# Patient Record
Sex: MALE | Race: BLACK OR AFRICAN AMERICAN | Employment: FULL TIME | URBAN - METROPOLITAN AREA
[De-identification: names, ages, dates, MRNs, and addresses within clinical notes are randomized per-mention and may not be internally consistent; named-entity substitution may affect disease eponyms.]

---

## 2022-07-22 ENCOUNTER — HOSPITAL ENCOUNTER (EMERGENCY)
Age: 57
Discharge: HOME | End: 2022-07-22
Attending: EMERGENCY MEDICINE
Payer: COMMERCIAL

## 2022-07-22 ENCOUNTER — APPOINTMENT (OUTPATIENT)
Dept: ULTRASOUND IMAGING | Age: 57
End: 2022-07-22
Attending: EMERGENCY MEDICINE
Payer: COMMERCIAL

## 2022-07-22 VITALS
DIASTOLIC BLOOD PRESSURE: 97 MMHG | HEIGHT: 69 IN | RESPIRATION RATE: 18 BRPM | SYSTOLIC BLOOD PRESSURE: 146 MMHG | TEMPERATURE: 98.7 F | OXYGEN SATURATION: 97 % | BODY MASS INDEX: 28.88 KG/M2 | HEART RATE: 64 BPM | WEIGHT: 195 LBS

## 2022-07-22 DIAGNOSIS — L03.116 CELLULITIS OF LEFT LEG: Primary | ICD-10-CM

## 2022-07-22 LAB
ALBUMIN SERPL-MCNC: 3.5 G/DL (ref 3.5–5.2)
ALBUMIN/GLOB SERPL: 0.9 {RATIO} (ref 1.1–2.2)
ALP SERPL-CCNC: 73 U/L (ref 40–129)
ALT SERPL-CCNC: 28 U/L (ref 10–50)
ANION GAP SERPL CALC-SCNC: 10 MMOL/L (ref 5–15)
AST SERPL-CCNC: 26 U/L (ref 10–50)
BASOPHILS # BLD: 0.1 K/UL (ref 0–0.1)
BASOPHILS NFR BLD: 1 % (ref 0–1)
BILIRUB SERPL-MCNC: 0.4 MG/DL (ref 0.2–1)
BUN SERPL-MCNC: 17 MG/DL (ref 6–20)
BUN/CREAT SERPL: 13 (ref 12–20)
CALCIUM SERPL-MCNC: 8.7 MG/DL (ref 8.6–10)
CHLORIDE SERPL-SCNC: 107 MMOL/L (ref 98–107)
CO2 SERPL-SCNC: 22 MMOL/L (ref 22–29)
CREAT SERPL-MCNC: 1.31 MG/DL (ref 0.7–1.2)
DIFFERENTIAL METHOD BLD: ABNORMAL
EOSINOPHIL # BLD: 0.2 K/UL (ref 0–0.4)
EOSINOPHIL NFR BLD: 3 % (ref 0–7)
ERYTHROCYTE [DISTWIDTH] IN BLOOD BY AUTOMATED COUNT: 12.9 % (ref 11.5–14.5)
GLOBULIN SER CALC-MCNC: 3.8 G/DL (ref 2–4)
GLUCOSE SERPL-MCNC: 97 MG/DL (ref 65–100)
HCT VFR BLD AUTO: 43.1 % (ref 36.6–50.3)
HGB BLD-MCNC: 15.1 G/DL (ref 12.1–17)
IMM GRANULOCYTES # BLD AUTO: 0 K/UL (ref 0–0.04)
IMM GRANULOCYTES NFR BLD AUTO: 1 % (ref 0–0.5)
LACTATE SERPL-SCNC: 0.8 MMOL/L (ref 0.4–2)
LYMPHOCYTES # BLD: 2.2 K/UL (ref 0.8–3.5)
LYMPHOCYTES NFR BLD: 31 % (ref 12–49)
MCH RBC QN AUTO: 30.8 PG (ref 26–34)
MCHC RBC AUTO-ENTMCNC: 35 G/DL (ref 30–36.5)
MCV RBC AUTO: 88 FL (ref 80–99)
MONOCYTES # BLD: 0.8 K/UL (ref 0–1)
MONOCYTES NFR BLD: 11 % (ref 5–13)
NEUTS SEG # BLD: 3.8 K/UL (ref 1.8–8)
NEUTS SEG NFR BLD: 53 % (ref 32–75)
NRBC # BLD: 0 K/UL (ref 0–0.01)
NRBC BLD-RTO: 0 PER 100 WBC
PLATELET # BLD AUTO: 242 K/UL (ref 150–400)
PMV BLD AUTO: 11.1 FL (ref 8.9–12.9)
POTASSIUM SERPL-SCNC: 4.6 MMOL/L (ref 3.5–5.1)
PROT SERPL-MCNC: 7.3 G/DL (ref 6.4–8.3)
RBC # BLD AUTO: 4.9 M/UL (ref 4.1–5.7)
SODIUM SERPL-SCNC: 139 MMOL/L (ref 136–145)
WBC # BLD AUTO: 7.2 K/UL (ref 4.1–11.1)

## 2022-07-22 PROCEDURE — 93971 EXTREMITY STUDY: CPT

## 2022-07-22 PROCEDURE — 36415 COLL VENOUS BLD VENIPUNCTURE: CPT

## 2022-07-22 PROCEDURE — 83605 ASSAY OF LACTIC ACID: CPT

## 2022-07-22 PROCEDURE — 87040 BLOOD CULTURE FOR BACTERIA: CPT

## 2022-07-22 PROCEDURE — 96361 HYDRATE IV INFUSION ADD-ON: CPT

## 2022-07-22 PROCEDURE — 74011250636 HC RX REV CODE- 250/636: Performed by: EMERGENCY MEDICINE

## 2022-07-22 PROCEDURE — 96360 HYDRATION IV INFUSION INIT: CPT

## 2022-07-22 PROCEDURE — 80053 COMPREHEN METABOLIC PANEL: CPT

## 2022-07-22 PROCEDURE — 85025 COMPLETE CBC W/AUTO DIFF WBC: CPT

## 2022-07-22 PROCEDURE — 99284 EMERGENCY DEPT VISIT MOD MDM: CPT

## 2022-07-22 RX ORDER — DOXYCYCLINE HYCLATE 100 MG
100 TABLET ORAL 2 TIMES DAILY
Qty: 14 TABLET | Refills: 0 | Status: SHIPPED | OUTPATIENT
Start: 2022-07-22 | End: 2022-07-29

## 2022-07-22 RX ORDER — SULFAMETHOXAZOLE AND TRIMETHOPRIM 800; 160 MG/1; MG/1
1 TABLET ORAL 2 TIMES DAILY
COMMUNITY
End: 2022-08-21

## 2022-07-22 RX ADMIN — SODIUM CHLORIDE 1000 ML: 9 INJECTION, SOLUTION INTRAVENOUS at 07:11

## 2022-07-22 NOTE — DISCHARGE INSTRUCTIONS
Please stop taking Bactrim. Begin taking doxycycline. Be sure to drink plenty of water with the new antibiotic as it can cause stomach upset. Thank you for allowing us to provide you with medical care today. We realize that you have many choices for your emergency care needs. We thank you for choosing [facility]. Please choose us in the future for any continued health care needs. We hope we addressed all of your medical concerns. We strive to provide excellent quality care in the Emergency Department. Anything less than excellent does not meet our expectations. The exam and treatment you received in the Emergency Department were for an emergent problem and are not intended as complete care. It is important that you follow up with a doctor, nurse practitioner, or physician's assistant for ongoing care. If your symptoms worsen or you do not improve as expected and you are unable to reach your usual health care provider, you should return to the Emergency Department. We are available 24 hours a day. Take this sheet with you when you go to your follow-up visit. If you have any problem arranging the follow-up visit, contact the Emergency Department immediately. Make an appointment your family doctor for follow up of this visit. Return to the ER if you are unable to be seen in a timely manner.

## 2022-07-22 NOTE — ED NOTES
42-year-old male received in turnover this morning pending remainder of his work-up. He presents with a left leg swelling. He was diagnosed with cellulitis several days ago and started on Bactrim without significant improvement. I personally evaluated the patient and feel that he likely has cellulitis based on my exam.  Labs and imaging are reassuring. Bactrim would cover staph well but does not offer strep coverage. His cellulitis is most likely to be strep given the presentation. Plan to switch to doxycycline with instructions to follow-up with primary care, return if needed.

## 2022-07-22 NOTE — ED TRIAGE NOTES
PT has left leg swelling for a week. He went to pt first on the 17th and then again on the 19th. They diagnosed him with cellulitis. He was given Sulfamethoxazle. Pt first said he needs to go to the ER and get IV antibiotics.

## 2022-07-22 NOTE — ED PROVIDER NOTES
49-year-old male with no significant past medical history presents with complaints of 8 days left lower extremity swelling with redness and warmth. Patient started Bactrim 5 days ago by urgent care center with minimal improvement. Patient had a follow-up appointment and was told if he did not improve after 2 days then he should come to the emergency department. Patient reports he has been having subjective fever and chills. Denies trauma. Denies history of DVT/PE. Denies chest pain, shortness of breath. Denies nausea, vomiting, diarrhea, constipation. denies urinary complaints. Denies tobacco use, drug use  Occasional alcohol use       No past medical history on file. No past surgical history on file. No family history on file. Social History     Socioeconomic History    Marital status:      Spouse name: Not on file    Number of children: Not on file    Years of education: Not on file    Highest education level: Not on file   Occupational History    Not on file   Tobacco Use    Smoking status: Not on file    Smokeless tobacco: Not on file   Substance and Sexual Activity    Alcohol use: Not on file    Drug use: Not on file    Sexual activity: Not on file   Other Topics Concern    Not on file   Social History Narrative    Not on file     Social Determinants of Health     Financial Resource Strain: Not on file   Food Insecurity: Not on file   Transportation Needs: Not on file   Physical Activity: Not on file   Stress: Not on file   Social Connections: Not on file   Intimate Partner Violence: Not on file   Housing Stability: Not on file         ALLERGIES: Patient has no known allergies. Review of Systems   Constitutional:  Positive for chills and fever. HENT:  Negative for congestion, nosebleeds and sore throat. Eyes:  Negative for pain and discharge. Respiratory:  Negative for cough and shortness of breath. Cardiovascular:  Positive for leg swelling.  Negative for chest pain and palpitations. Gastrointestinal:  Negative for abdominal pain, constipation, nausea and vomiting. Genitourinary:  Negative for decreased urine volume, dysuria, flank pain and urgency. Musculoskeletal:  Negative for gait problem and myalgias. Skin:  Positive for color change. Negative for rash and wound. Neurological:  Negative for seizures and syncope. Hematological:  Does not bruise/bleed easily. Psychiatric/Behavioral:  Negative for confusion, self-injury and suicidal ideas. Vitals:    07/22/22 0643   BP: (!) 158/108   Pulse: 64   Resp: 18   Temp: 98.7 °F (37.1 °C)   SpO2: 96%   Weight: 88.5 kg (195 lb)   Height: 5' 9\" (1.753 m)            Physical Exam  Vitals and nursing note reviewed. Constitutional:       Appearance: He is well-developed. HENT:      Head: Normocephalic and atraumatic. Eyes:      Pupils: Pupils are equal, round, and reactive to light. Cardiovascular:      Rate and Rhythm: Normal rate and regular rhythm. Heart sounds: Normal heart sounds. Pulmonary:      Effort: Pulmonary effort is normal. No respiratory distress. Breath sounds: Normal breath sounds. No wheezing. Abdominal:      General: Bowel sounds are normal.      Palpations: Abdomen is soft. Tenderness: There is no abdominal tenderness. There is no guarding or rebound. Musculoskeletal:         General: Normal range of motion. Cervical back: Normal range of motion and neck supple. Comments: Left lower extremity swelling with warmth and mild erythema up to knee. Dorsal pedal pulses 2+ bilaterally   Skin:     General: Skin is warm and dry. Neurological:      Mental Status: He is alert and oriented to person, place, and time.    Psychiatric:         Behavior: Behavior normal.        MDM  Number of Diagnoses or Management Options  Diagnosis management comments: 68-year-old male with no significant past medical history presents with complaints of 8 days left lower extremity swelling with subjective fever and chills. Patient is afebrile, nontoxic, hemodynamically stable, no respiratory distress, clear to auscultation bilaterally, normal room oxygen saturation, speaking complete sentences. Plan-CBC/CMP/lactate, blood cultures, IV fluid hydration, left lower extremity duplex. Amount and/or Complexity of Data Reviewed  Clinical lab tests: ordered  Tests in the radiology section of CPT®: ordered           Procedures        7AM  Change of shift. Care of patient signed over to Estrella Castrejon. Bedside handoff complete. Awaiting labs, duplex, dispo.

## 2022-07-27 LAB
BACTERIA SPEC CULT: NORMAL
SERVICE CMNT-IMP: NORMAL

## 2022-08-21 ENCOUNTER — HOSPITAL ENCOUNTER (INPATIENT)
Age: 57
LOS: 4 days | Discharge: HOME | DRG: 603 | End: 2022-08-26
Attending: EMERGENCY MEDICINE | Admitting: STUDENT IN AN ORGANIZED HEALTH CARE EDUCATION/TRAINING PROGRAM
Payer: COMMERCIAL

## 2022-08-21 ENCOUNTER — HOSPITAL ENCOUNTER (EMERGENCY)
Dept: CT IMAGING | Age: 57
Discharge: HOME | DRG: 603 | End: 2022-08-21
Attending: EMERGENCY MEDICINE
Payer: COMMERCIAL

## 2022-08-21 DIAGNOSIS — L03.116 CELLULITIS OF LEFT LOWER EXTREMITY: ICD-10-CM

## 2022-08-21 DIAGNOSIS — B95.1 BACTEREMIA DUE TO GROUP B STREPTOCOCCUS: ICD-10-CM

## 2022-08-21 DIAGNOSIS — D72.829 LEUKOCYTOSIS, UNSPECIFIED TYPE: ICD-10-CM

## 2022-08-21 DIAGNOSIS — R78.81 BACTEREMIA DUE TO GROUP B STREPTOCOCCUS: ICD-10-CM

## 2022-08-21 DIAGNOSIS — A41.9 SEPSIS WITHOUT ACUTE ORGAN DYSFUNCTION, DUE TO UNSPECIFIED ORGANISM (HCC): Primary | ICD-10-CM

## 2022-08-21 LAB
ALBUMIN SERPL-MCNC: 3.8 G/DL (ref 3.5–5.2)
ALBUMIN/GLOB SERPL: 1.1 {RATIO} (ref 1.1–2.2)
ALP SERPL-CCNC: 77 U/L (ref 40–129)
ALT SERPL-CCNC: 20 U/L (ref 10–50)
ANION GAP SERPL CALC-SCNC: 10 MMOL/L (ref 5–15)
APTT PPP: 26.1 SEC (ref 21.2–34.1)
AST SERPL-CCNC: 29 U/L (ref 10–50)
BASOPHILS # BLD: 0 K/UL (ref 0–0.1)
BASOPHILS NFR BLD: 0 % (ref 0–1)
BILIRUB SERPL-MCNC: 0.4 MG/DL (ref 0.2–1)
BUN SERPL-MCNC: 13 MG/DL (ref 6–20)
BUN/CREAT SERPL: 12 (ref 12–20)
CALCIUM SERPL-MCNC: 9.2 MG/DL (ref 8.6–10)
CHLORIDE SERPL-SCNC: 104 MMOL/L (ref 98–107)
CK SERPL-CCNC: 183 U/L (ref 20–200)
CO2 SERPL-SCNC: 26 MMOL/L (ref 22–29)
COMMENT, HOLDF: NORMAL
CREAT SERPL-MCNC: 1.1 MG/DL (ref 0.7–1.2)
CRP SERPL-MCNC: 1.08 MG/DL
DIFFERENTIAL METHOD BLD: ABNORMAL
EOSINOPHIL # BLD: 0 K/UL (ref 0–0.4)
EOSINOPHIL NFR BLD: 0 % (ref 0–7)
ERYTHROCYTE [DISTWIDTH] IN BLOOD BY AUTOMATED COUNT: 12.6 % (ref 11.5–14.5)
GLOBULIN SER CALC-MCNC: 3.6 G/DL (ref 2–4)
GLUCOSE SERPL-MCNC: 96 MG/DL (ref 65–100)
HCT VFR BLD AUTO: 40 % (ref 36.6–50.3)
HGB BLD-MCNC: 13.8 G/DL (ref 12.1–17)
IMM GRANULOCYTES # BLD AUTO: 0.1 K/UL (ref 0–0.04)
IMM GRANULOCYTES NFR BLD AUTO: 0 % (ref 0–0.5)
INR PPP: 1.1 (ref 0.9–1.1)
LACTATE SERPL-SCNC: 2.4 MMOL/L (ref 0.4–2)
LYMPHOCYTES # BLD: 0.9 K/UL (ref 0.8–3.5)
LYMPHOCYTES NFR BLD: 6 % (ref 12–49)
MCH RBC QN AUTO: 30.6 PG (ref 26–34)
MCHC RBC AUTO-ENTMCNC: 34.5 G/DL (ref 30–36.5)
MCV RBC AUTO: 88.7 FL (ref 80–99)
MONOCYTES # BLD: 0.7 K/UL (ref 0–1)
MONOCYTES NFR BLD: 5 % (ref 5–13)
NEUTS SEG # BLD: 13.7 K/UL (ref 1.8–8)
NEUTS SEG NFR BLD: 89 % (ref 32–75)
NRBC # BLD: 0 K/UL (ref 0–0.01)
NRBC BLD-RTO: 0 PER 100 WBC
PLATELET # BLD AUTO: 255 K/UL (ref 150–400)
PMV BLD AUTO: 10 FL (ref 8.9–12.9)
POTASSIUM SERPL-SCNC: 4.1 MMOL/L (ref 3.5–5.1)
PROT SERPL-MCNC: 7.4 G/DL (ref 6.4–8.3)
PROTHROMBIN TIME: 14.2 SEC (ref 11.9–14.6)
RBC # BLD AUTO: 4.51 M/UL (ref 4.1–5.7)
SAMPLES BEING HELD,HOLD: NORMAL
SODIUM SERPL-SCNC: 140 MMOL/L (ref 136–145)
THERAPEUTIC RANGE,PTTT: NORMAL SECS (ref 82–109)
WBC # BLD AUTO: 15.4 K/UL (ref 4.1–11.1)

## 2022-08-21 PROCEDURE — 87186 SC STD MICRODIL/AGAR DIL: CPT

## 2022-08-21 PROCEDURE — 74011250637 HC RX REV CODE- 250/637: Performed by: EMERGENCY MEDICINE

## 2022-08-21 PROCEDURE — 96374 THER/PROPH/DIAG INJ IV PUSH: CPT

## 2022-08-21 PROCEDURE — 87147 CULTURE TYPE IMMUNOLOGIC: CPT

## 2022-08-21 PROCEDURE — 96375 TX/PRO/DX INJ NEW DRUG ADDON: CPT

## 2022-08-21 PROCEDURE — 87150 DNA/RNA AMPLIFIED PROBE: CPT

## 2022-08-21 PROCEDURE — 74011250636 HC RX REV CODE- 250/636: Performed by: EMERGENCY MEDICINE

## 2022-08-21 PROCEDURE — 96365 THER/PROPH/DIAG IV INF INIT: CPT

## 2022-08-21 PROCEDURE — 72193 CT PELVIS W/DYE: CPT

## 2022-08-21 PROCEDURE — 99285 EMERGENCY DEPT VISIT HI MDM: CPT

## 2022-08-21 PROCEDURE — 87077 CULTURE AEROBIC IDENTIFY: CPT

## 2022-08-21 PROCEDURE — 36415 COLL VENOUS BLD VENIPUNCTURE: CPT

## 2022-08-21 PROCEDURE — 86140 C-REACTIVE PROTEIN: CPT

## 2022-08-21 PROCEDURE — 80053 COMPREHEN METABOLIC PANEL: CPT

## 2022-08-21 PROCEDURE — 85025 COMPLETE CBC W/AUTO DIFF WBC: CPT

## 2022-08-21 PROCEDURE — 83605 ASSAY OF LACTIC ACID: CPT

## 2022-08-21 PROCEDURE — 85610 PROTHROMBIN TIME: CPT

## 2022-08-21 PROCEDURE — 87040 BLOOD CULTURE FOR BACTERIA: CPT

## 2022-08-21 PROCEDURE — 74011000636 HC RX REV CODE- 636: Performed by: EMERGENCY MEDICINE

## 2022-08-21 PROCEDURE — 85730 THROMBOPLASTIN TIME PARTIAL: CPT

## 2022-08-21 PROCEDURE — 74011000258 HC RX REV CODE- 258: Performed by: EMERGENCY MEDICINE

## 2022-08-21 PROCEDURE — 93005 ELECTROCARDIOGRAM TRACING: CPT

## 2022-08-21 PROCEDURE — 82550 ASSAY OF CK (CPK): CPT

## 2022-08-21 RX ORDER — VANCOMYCIN 2 GRAM/500 ML IN 0.9 % SODIUM CHLORIDE INTRAVENOUS
2000 ONCE
Status: DISCONTINUED | OUTPATIENT
Start: 2022-08-21 | End: 2022-08-21 | Stop reason: SDUPTHER

## 2022-08-21 RX ORDER — ONDANSETRON 2 MG/ML
4 INJECTION INTRAMUSCULAR; INTRAVENOUS
Status: COMPLETED | OUTPATIENT
Start: 2022-08-21 | End: 2022-08-21

## 2022-08-21 RX ORDER — ACETAMINOPHEN 500 MG
1000 TABLET ORAL
Status: COMPLETED | OUTPATIENT
Start: 2022-08-21 | End: 2022-08-21

## 2022-08-21 RX ORDER — MORPHINE SULFATE 4 MG/ML
4 INJECTION INTRAVENOUS ONCE
Status: COMPLETED | OUTPATIENT
Start: 2022-08-21 | End: 2022-08-21

## 2022-08-21 RX ORDER — KETOROLAC TROMETHAMINE 30 MG/ML
30 INJECTION, SOLUTION INTRAMUSCULAR; INTRAVENOUS
Status: COMPLETED | OUTPATIENT
Start: 2022-08-21 | End: 2022-08-21

## 2022-08-21 RX ADMIN — VANCOMYCIN HYDROCHLORIDE 1250 MG: 1.25 INJECTION, POWDER, LYOPHILIZED, FOR SOLUTION INTRAVENOUS at 22:51

## 2022-08-21 RX ADMIN — PIPERACILLIN AND TAZOBACTAM 4.5 G: 4; .5 INJECTION, POWDER, FOR SOLUTION INTRAVENOUS at 22:47

## 2022-08-21 RX ADMIN — ONDANSETRON 4 MG: 2 INJECTION INTRAMUSCULAR; INTRAVENOUS at 22:41

## 2022-08-21 RX ADMIN — MORPHINE SULFATE 4 MG: 4 INJECTION INTRAVENOUS at 22:41

## 2022-08-21 RX ADMIN — SODIUM CHLORIDE 1000 ML: 9 INJECTION, SOLUTION INTRAVENOUS at 23:05

## 2022-08-21 RX ADMIN — KETOROLAC TROMETHAMINE 30 MG: 30 INJECTION, SOLUTION INTRAMUSCULAR at 22:41

## 2022-08-21 RX ADMIN — ACETAMINOPHEN 1000 MG: 500 TABLET ORAL at 23:35

## 2022-08-21 RX ADMIN — IOPAMIDOL 100 ML: 755 INJECTION, SOLUTION INTRAVENOUS at 23:25

## 2022-08-22 ENCOUNTER — APPOINTMENT (OUTPATIENT)
Dept: VASCULAR SURGERY | Age: 57
DRG: 603 | End: 2022-08-22
Attending: INTERNAL MEDICINE
Payer: COMMERCIAL

## 2022-08-22 PROBLEM — A41.9 SEPSIS (HCC): Status: ACTIVE | Noted: 2022-08-22

## 2022-08-22 LAB
ATRIAL RATE: 91 BPM
CALCULATED P AXIS, ECG09: 61 DEGREES
CALCULATED R AXIS, ECG10: -2 DEGREES
CALCULATED T AXIS, ECG11: 37 DEGREES
DIAGNOSIS, 93000: NORMAL
LACTATE SERPL-SCNC: 2 MMOL/L (ref 0.4–2)
LACTATE SERPL-SCNC: 2.4 MMOL/L (ref 0.4–2)
P-R INTERVAL, ECG05: 128 MS
Q-T INTERVAL, ECG07: 328 MS
QRS DURATION, ECG06: 86 MS
QTC CALCULATION (BEZET), ECG08: 403 MS
VENTRICULAR RATE, ECG03: 91 BPM

## 2022-08-22 PROCEDURE — 83605 ASSAY OF LACTIC ACID: CPT

## 2022-08-22 PROCEDURE — 93970 EXTREMITY STUDY: CPT

## 2022-08-22 PROCEDURE — 74011000250 HC RX REV CODE- 250: Performed by: INTERNAL MEDICINE

## 2022-08-22 PROCEDURE — 74011250636 HC RX REV CODE- 250/636: Performed by: EMERGENCY MEDICINE

## 2022-08-22 PROCEDURE — 36415 COLL VENOUS BLD VENIPUNCTURE: CPT

## 2022-08-22 PROCEDURE — 74011250636 HC RX REV CODE- 250/636: Performed by: INTERNAL MEDICINE

## 2022-08-22 PROCEDURE — 74011250637 HC RX REV CODE- 250/637: Performed by: INTERNAL MEDICINE

## 2022-08-22 PROCEDURE — 74011000258 HC RX REV CODE- 258: Performed by: INTERNAL MEDICINE

## 2022-08-22 PROCEDURE — 65270000029 HC RM PRIVATE

## 2022-08-22 RX ORDER — ONDANSETRON 4 MG/1
4 TABLET, ORALLY DISINTEGRATING ORAL
Status: DISCONTINUED | OUTPATIENT
Start: 2022-08-22 | End: 2022-08-26 | Stop reason: HOSPADM

## 2022-08-22 RX ORDER — ENOXAPARIN SODIUM 100 MG/ML
40 INJECTION SUBCUTANEOUS DAILY
Status: DISCONTINUED | OUTPATIENT
Start: 2022-08-22 | End: 2022-08-26 | Stop reason: HOSPADM

## 2022-08-22 RX ORDER — POLYETHYLENE GLYCOL 3350 17 G/17G
17 POWDER, FOR SOLUTION ORAL DAILY PRN
Status: DISCONTINUED | OUTPATIENT
Start: 2022-08-22 | End: 2022-08-26 | Stop reason: HOSPADM

## 2022-08-22 RX ORDER — SODIUM CHLORIDE 0.9 % (FLUSH) 0.9 %
5-40 SYRINGE (ML) INJECTION EVERY 8 HOURS
Status: DISCONTINUED | OUTPATIENT
Start: 2022-08-22 | End: 2022-08-26 | Stop reason: HOSPADM

## 2022-08-22 RX ORDER — ACETAMINOPHEN 650 MG/1
650 SUPPOSITORY RECTAL
Status: DISCONTINUED | OUTPATIENT
Start: 2022-08-22 | End: 2022-08-26 | Stop reason: HOSPADM

## 2022-08-22 RX ORDER — SODIUM CHLORIDE 0.9 % (FLUSH) 0.9 %
5-40 SYRINGE (ML) INJECTION AS NEEDED
Status: DISCONTINUED | OUTPATIENT
Start: 2022-08-22 | End: 2022-08-26 | Stop reason: HOSPADM

## 2022-08-22 RX ORDER — ACETAMINOPHEN 325 MG/1
650 TABLET ORAL
Status: DISCONTINUED | OUTPATIENT
Start: 2022-08-22 | End: 2022-08-26 | Stop reason: HOSPADM

## 2022-08-22 RX ORDER — SODIUM CHLORIDE 9 MG/ML
125 INJECTION, SOLUTION INTRAVENOUS CONTINUOUS
Status: DISCONTINUED | OUTPATIENT
Start: 2022-08-22 | End: 2022-08-24

## 2022-08-22 RX ORDER — ONDANSETRON 2 MG/ML
4 INJECTION INTRAMUSCULAR; INTRAVENOUS
Status: DISCONTINUED | OUTPATIENT
Start: 2022-08-22 | End: 2022-08-26 | Stop reason: HOSPADM

## 2022-08-22 RX ADMIN — CEFEPIME 2 G: 2 INJECTION, POWDER, FOR SOLUTION INTRAVENOUS at 20:48

## 2022-08-22 RX ADMIN — VANCOMYCIN HYDROCHLORIDE 1000 MG: 1 INJECTION, POWDER, LYOPHILIZED, FOR SOLUTION INTRAVENOUS at 02:53

## 2022-08-22 RX ADMIN — ENOXAPARIN SODIUM 40 MG: 100 INJECTION SUBCUTANEOUS at 09:30

## 2022-08-22 RX ADMIN — SODIUM CHLORIDE 125 ML/HR: 9 INJECTION, SOLUTION INTRAVENOUS at 10:52

## 2022-08-22 RX ADMIN — VANCOMYCIN HYDROCHLORIDE 750 MG: 750 INJECTION, POWDER, LYOPHILIZED, FOR SOLUTION INTRAVENOUS at 22:15

## 2022-08-22 RX ADMIN — ACETAMINOPHEN 650 MG: 325 TABLET ORAL at 15:34

## 2022-08-22 RX ADMIN — SODIUM CHLORIDE 2 G: 9 INJECTION INTRAMUSCULAR; INTRAVENOUS; SUBCUTANEOUS at 09:30

## 2022-08-22 RX ADMIN — VANCOMYCIN HYDROCHLORIDE 750 MG: 750 INJECTION, POWDER, LYOPHILIZED, FOR SOLUTION INTRAVENOUS at 10:52

## 2022-08-22 NOTE — PROGRESS NOTES
[facility] Progress Note: Antimicrobial Stewardship    Consult for antibiotic dosing of vancomycin by Dr. Harini Frances  Indication: SSTI  Day of Therapy: 2    Plan:  Vancomycin therapy: The patient received vancomycin 2250 mg in the ED. Follow with maintenance dose of vancomycin 750 mg IV every 12 hours    Dose calculated to approximate a   Target AUC/MARCELO of less than 500  Trough of 10 - 15 mcg/mL. Plan:  Pharmacy to follow daily and will make changes to dose and/or frequency based on clinical status. Other Antimicrobial  (not dosed by pharmacist)   cefepime   Cultures     8/21 Blood x 2 In process   Serum Creatinine     Lab Results   Component Value Date/Time    Creatinine 1.10 08/21/2022 10:14 PM       Creatinine Clearance Estimated Creatinine Clearance: 81.5 mL/min (based on SCr of 1.1 mg/dL). Procalcitonin  No results found for: PCT     Temp   98.4 °F (36.9 °C)    WBC   Lab Results   Component Value Date/Time    WBC 15.4 (H) 08/21/2022 10:14 PM       For Antifungals, Metronidazole and Nafcillin: Lab Results   Component Value Date/Time    ALT (SGPT) 20 08/21/2022 10:14 PM    AST (SGOT) 29 08/21/2022 10:14 PM    Alk.  phosphatase 77 08/21/2022 10:14 PM    Bilirubin, total 0.4 08/21/2022 10:14 PM         Pharmacist: Yaz Napier

## 2022-08-22 NOTE — PROGRESS NOTES
Reason for Admission:  sepsis  Left leg cellulitis                   RUR Score:       5%              Plan for utilizing home health:      none    PCP: First and Last name:  Pt has been seen at [facility]    Name of Practice:    Are you a current patient: Yes/No:    Approximate date of last visit: 5 years ago   Can you participate in a virtual visit with your PCP:                     Current Advanced Directive/Advance Care Plan: Full Code  None--his wife is his next of kin    Healthcare Decision Maker:   Click here to complete [address] including selection of the Healthcare Decision Maker Relationship (ie \"Primary\")                             Transition of Care Plan:                    Met with pt for d/c planning. PTA he stated he was independent with ADL's, was driving and used no DME with ambulation. He lives in a 2 story house with his wife and son. There are 15 interior stairs and 5 entry steps. Medications are from [employer] [facility] [employer] [address]. Pt is being medically managed with IV vanc and cefepime  Doppler to r/o DVT  Pt was given a list of [employer] providers from which to choose a PCP  CM following for any d/c needs  Family to transport him home at d/c    Care Management Interventions  PCP Verified by CM: Yes  Mode of Transport at Discharge: Other (see comment)  Support Systems: Spouse/Significant Other, Child(kayla), Friend/Neighbor  Confirm Follow Up Transport: Family  Discharge Location  Patient Expects to be Discharged to[de-identified] Home with family assistance  GIL Russell

## 2022-08-22 NOTE — ED PROVIDER NOTES
Healthy. He presents accompanied by his wife with complaints of right groin pain. It began earlier today. It has progressed in its severity. He has had chills and feels nauseated. He was seen here approximately 1 month ago and diagnosed with left lower leg cellulitis. He had been seen previously for this at an urgent care center and prescribed antibiotics. He was prescribed a different antibiotic here. He took it for 7 days. He states that the symptoms improved but persisted. He was seen at another ED and prescribed 10 more days of the same antibiotic. He states that the right lower leg symptoms improved but never completely resolved. They have worsened again over the past few days. He has left lower leg swelling, discomfort, warmth and redness. However, today is when he began to notice the right groin pain. He believes he has a swollen lymph node in that region. He also has had chills and nausea. He took ibuprofen with limited relief. History reviewed. No pertinent past medical history. Past Surgical History:   Procedure Laterality Date    HX ORTHOPAEDIC           History reviewed. No pertinent family history.     Social History     Socioeconomic History    Marital status:      Spouse name: Not on file    Number of children: Not on file    Years of education: Not on file    Highest education level: Not on file   Occupational History    Not on file   Tobacco Use    Smoking status: Never    Smokeless tobacco: Never   Vaping Use    Vaping Use: Never used   Substance and Sexual Activity    Alcohol use: Yes    Drug use: Never    Sexual activity: Yes   Other Topics Concern    Not on file   Social History Narrative    Not on file     Social Determinants of Health     Financial Resource Strain: Not on file   Food Insecurity: Not on file   Transportation Needs: Not on file   Physical Activity: Not on file   Stress: Not on file   Social Connections: Not on file   Intimate Partner Violence: Not on file   Housing Stability: Not on file         ALLERGIES: Patient has no known allergies. Review of Systems   All other systems reviewed and are negative. Vitals:    08/21/22 2203   BP: (!) 124/108   Pulse: 93   Resp: 18   Temp: 98.2 °F (36.8 °C)   SpO2: 99%   Weight: 88.5 kg (195 lb)   Height: 5' 9\" (1.753 m)            Physical Exam  Vitals and nursing note reviewed. Constitutional:       Appearance: He is well-developed. Comments: Appears uncomfortable. HENT:      Head: Normocephalic and atraumatic. Eyes:      Conjunctiva/sclera: Conjunctivae normal.   Neck:      Trachea: No tracheal deviation. Cardiovascular:      Rate and Rhythm: Normal rate and regular rhythm. Heart sounds: Normal heart sounds. No murmur heard. No friction rub. No gallop. Comments: 2+ femoral and pedal pulses. Pulmonary:      Effort: Pulmonary effort is normal.      Breath sounds: Normal breath sounds. Abdominal:      Palpations: Abdomen is soft. Tenderness: There is no abdominal tenderness. Musculoskeletal:         General: No deformity. Cervical back: Neck supple. Comments: Right groin tenderness at the site of a suspected enlarged lymph node. Skin:     General: Skin is warm and dry. Comments: Left lower leg redness, warmth, tenderness, and swelling. Neurological:      Mental Status: He is alert. Comments: oriented        MDM         Procedures    EKG: Normal sinus rhythm; rate of 91; nonspecific ST, T wave abnormalities.   Delfin Berry MD  10:48 PM    [employer] Consult for Admission  11:56 PM    ED Room Number: C05/C05  Patient Name and age:  Marilin Montes 62 y.o.  male  Working Diagnosis: Left leg cellulitis/sepsis    COVID-19 Suspicion:  no  Sepsis present:  yesReassessment needed: yes  Code Status:  Full Code  Readmission: no  Isolation Requirements:  no  Recommended Level of Care:  med/surg  Department: [facility] ED  Other: Presents with chills, nausea, left lower leg redness, warmth, swelling, pain. He recently completed a 17-day course of oral antibiotics, and it has returned. Zosyn/Vanco in the ED. Meets sepsis criteria. Consult note: I reached out to the hospitalist service via [employer] for admission. Karla Stephen MD  12:00 AM    IMPRESSION:  Sepsis/cellulitis    - Broad Spectrum Antibiotics ordered: Zosyn and Vancomycin  - Repeat lactic acid ordered for time 0035  - Re-assessment performed at time 0030 and clinical condition stable.     - Hypotension or Lactic Acidosis present (SBP<90, MAP<65, Lactate >4): NO IVF:  Not indicated due to septic shock not present  - Persistent Hypotension despite IVF resuscitation: NO  Vasopressors: Not indicated due to septic shock not present    Plan:  Admit to Telemetry    Total critical care time (not including time spent performing separately reportable procedures): 35 min      Karla Stephen MD

## 2022-08-22 NOTE — PROGRESS NOTES
Spiritual Care Assessment/Progress Note  [address]      NAME: Pradeep Israel      MRN: [id number]  AGE: 62 y.o.  SEX: male  Hindu Affiliation: No preference   Language: English     8/22/2022     Total Time (in minutes): 25     Spiritual Assessment begun in [facility] 3 PRO CARE TELE 2 through conversation with:         [x]Patient        [] Family    [] Friend(s)        Reason for Consult: Initial/Spiritual assessment, patient floor     Spiritual beliefs: (Please include comment if needed)     [] Identifies with a piedad tradition:         [] Supported by a piedad community:            [x] Claims no spiritual orientation:           [] Seeking spiritual identity:                [] Adheres to an individual form of spirituality:           [] Not able to assess:                           Identified resources for coping:      [] Prayer                               [] Music                  [] Guided Imagery     [x] Family/friends                 [] Pet visits     [] Devotional reading                         [] Unknown     [] Other:                                               Interventions offered during this visit: (See comments for more details)    Patient Interventions: Affirmation of emotions/emotional suffering, Affirmation of piedad, Catharsis/review of pertinent events in supportive environment, Normalization of emotional/spiritual concerns           Plan of Care:     [] Support spiritual and/or cultural needs    [] Support AMD and/or advance care planning process      [] Support grieving process   [] Coordinate Rites and/or Rituals    [] Coordination with community clergy   [] No spiritual needs identified at this time   [] Detailed Plan of Care below (See Comments)  [] Make referral to Music Therapy  [] Make referral to Pet Therapy     [] Make referral to Addiction services  [] Make referral to [facility]  [] Make referral to Spiritual Care Partner  [] No future visits requested        [x] Follow up visits as needed     Visited patient for initial spiritual assessment. He spoke of his current illness and the attempts he has made to treat and gain relief from same. He reports feeling some better today. He lives with his wife. The couple have two children, one of whom is in college and a high school senior who lives at home. No spiritual needs at time of visit.    Chaplain Karlee, MDiv, MS, [facility]

## 2022-08-22 NOTE — ED TRIAGE NOTES
Pt in ED with c/o right leg pain x1 day. Pt said he was seen in this facility for left leg cellulitis and was on doxy for 17 days. Pt said he has been off antibiotics for 5 days and now the pain is back and in the right leg in the groin. Pt endorses nausea but denies vomiting and diarrhea.

## 2022-08-22 NOTE — PROGRESS NOTES
Pharmacist adjusted cefepime to 2 gram load and 2 gram IV every 12 hours with each dose via extended infusion based on P and T approved protocol    Thank you,      Delphia Spurling, PharmD, BCPS

## 2022-08-22 NOTE — PROGRESS NOTES
TRANSFER - IN REPORT:    Verbal report received from Janine(name) on [facility]  being received from ED(unit) for routine progression of care      Report consisted of patients Situation, Background, Assessment and   Recommendations(SBAR). Information from the following report(s) SBAR, Kardex, Med Rec Status, and Cardiac Rhythm NSR  was reviewed with the receiving nurse. Opportunity for questions and clarification was provided. Assessment completed upon patients arrival to unit and care assumed.

## 2022-08-22 NOTE — H&P
[facility]  [address]  [phone]    Admission History and Physical      NAME:  Melba Gregorio   :   1965   MRN:  [id number]     PCP:  None     Date of service:  2022         Subjective:     CHIEF COMPLAINT: Left leg swelling, pain pain, redness    HISTORY OF PRESENT ILLNESS:     Mr. Daniel Whitfield is a 62 y.o.  male who is admitted with left leg cellulitis. Mr. Daniel Whitfield without significant past medical history presented to ER complaining of left leg swelling, pain, redness, which was started about a month ago. Patient was seen at different healthcare facilities and was started on antibiotics, one of them was doxycycline, which he completed. The swelling and redness got better, but after finishing the antibiotics he started to have pain and swelling associated with a fever. In ER, patient had a fever of 100.5. Has nausea but denies vomiting      History reviewed. No pertinent past medical history. Past Surgical History:   Procedure Laterality Date    HX ORTHOPAEDIC         Social History     Tobacco Use    Smoking status: Never    Smokeless tobacco: Never   Substance Use Topics    Alcohol use: Yes        History reviewed. No pertinent family history.   DM: Grandparents    No Known Allergies     Prior to Admission medications    Not on File         Review of Systems:  (bold if positive, if negative)    Gen:  Eyes:  ENT:  CVS:  Pulm:  GI:  :  MS:  Skin:  erythema, Psych:  Endo:  Hem:  Renal:  Neuro:            Objective:      VITALS:    Vital signs reviewed; most recent are:    Visit Vitals  /71 (BP 1 Location: Right upper arm, BP Patient Position: At rest)   Pulse 70   Temp 98.4 °F (36.9 °C)   Resp 24   Ht 5' 9\" (1.753 m)   Wt 88.5 kg (195 lb)   SpO2 99%   BMI 28.80 kg/m²     SpO2 Readings from Last 6 Encounters:   22 99%   22 97%          Intake/Output Summary (Last 24 hours) at 2022 2904  Last data filed at 8/22/2022 0200  Gross per 24 hour   Intake 1100 ml   Output --   Net 1100 ml            Exam:     Physical Exam:    Gen:  Well-developed, well-nourished, in no acute distress  HEENT:  Pink conjunctivae, PERRL, hearing intact to voice, moist mucous membranes  Neck:  Supple, without masses, thyroid non-tender  Resp:  No accessory muscle use, clear breath sounds without wheezes rales or rhonchi  Card:  No murmurs, normal S1, S2 without thrills, bruits. + peripheral edema  Abd:  Soft, non-tender, non-distended, normoactive bowel sounds are present, no palpable organomegaly and no detectable hernias  Lymph:  No cervical or inguinal adenopathy  Musc:  No cyanosis or clubbing  Skin:  No rashes or ulcers, skin turgor is good  Neuro:  Cranial nerves are grossly intact, no focal motor weakness, follows commands appropriately  Psych:  Good insight, oriented to person, place and time, alert       Labs:    Recent Labs     08/21/22  2214   WBC 15.4*   HGB 13.8   HCT 40.0        Recent Labs     08/21/22  2214      K 4.1      CO2 26   GLU 96   BUN 13   CREA 1.10   CA 9.2   ALB 3.8   TBILI 0.4   ALT 20     No results found for: GLUCPOC  No results for input(s): PH, PCO2, PO2, HCO3, FIO2 in the last 72 hours. Recent Labs     08/21/22  2214   INR 1.1       Telemetry reviewed:          Assessment/Plan:    Left leg cellulitis. Failed outpatient twice antibiotics. Will start vancomycin and cefepime and monitor clinically. Check bilateral leg Doppler to rule out DVT. 2.  SIRS. Fever, leukocytosis. Likely secondary to above. Check blood cultures.   Continue antibiotics, IV fluids        Previous medical records reviewed     Risk of deterioration: high      Total time spent with patient: 79 [address] discussed with: Patient, Nursing Staff, and >50% of time spent in counseling and coordination of care    Discussed:  Care Plan    Prophylaxis:  Lovenox    Probable Disposition:  Home w/Family           ___________________________________________________    Attending Physician: Giselle Rudd MD

## 2022-08-23 LAB
ACC. NO. FROM MICRO ORDER, ACCP: ABNORMAL
ACINETOBACTER CALCOACETICUS-BAUMANII COMPLEX, ACBCX: NOT DETECTED
ANION GAP SERPL CALC-SCNC: 4 MMOL/L (ref 5–15)
BACTEROIDES FRAGILIS, BFRA: NOT DETECTED
BIOFIRE COMMENT, BCIDPF: ABNORMAL
BUN SERPL-MCNC: 13 MG/DL (ref 6–20)
BUN/CREAT SERPL: 11 (ref 12–20)
C GLABRATA DNA VAG QL NAA+PROBE: NOT DETECTED
CALCIUM SERPL-MCNC: 7.8 MG/DL (ref 8.5–10.1)
CANDIDA ALBICANS: NOT DETECTED
CANDIDA AURIS, CAAU: NOT DETECTED
CANDIDA KRUSEI, CKRP: NOT DETECTED
CANDIDA PARAPSILOSIS, CPAUP: NOT DETECTED
CANDIDA TROPICALIS, CTROP: NOT DETECTED
CHLORIDE SERPL-SCNC: 111 MMOL/L (ref 97–108)
CO2 SERPL-SCNC: 26 MMOL/L (ref 21–32)
COMMENT, HOLDF: NORMAL
CREAT SERPL-MCNC: 1.16 MG/DL (ref 0.7–1.3)
CRYPTO NEOFORMANS/GATTII, CRYNEG: NOT DETECTED
ENTEROBACTER CLOACAE COMPLEX, ECCP: NOT DETECTED
ENTEROBACTERALES SP. , ENBLS: NOT DETECTED
ENTEROCOCCUS FAECALIS, ENFA: NOT DETECTED
ENTEROCOCCUS FAECIUM, ENFAM: NOT DETECTED
ERYTHROCYTE [DISTWIDTH] IN BLOOD BY AUTOMATED COUNT: 13.2 % (ref 11.5–14.5)
ESCHERICHIA COLI: NOT DETECTED
GLUCOSE SERPL-MCNC: 89 MG/DL (ref 65–100)
HAEMOPHILUS INFLUENZAE, HMI: NOT DETECTED
HCT VFR BLD AUTO: 36.5 % (ref 36.6–50.3)
HGB BLD-MCNC: 12.1 G/DL (ref 12.1–17)
KLEBSIELLA AEROGENES, KLAE: NOT DETECTED
KLEBSIELLA OXYTOCA: NOT DETECTED
KLEBSIELLA PNEUMONIAE GROUP, KPPG: NOT DETECTED
LISTERIA MONOCYTOGENES, LMONP: NOT DETECTED
MCH RBC QN AUTO: 30.3 PG (ref 26–34)
MCHC RBC AUTO-ENTMCNC: 33.2 G/DL (ref 30–36.5)
MCV RBC AUTO: 91.3 FL (ref 80–99)
NEISSERIA MENINGITIDIS, NMNI: NOT DETECTED
NRBC # BLD: 0 K/UL (ref 0–0.01)
NRBC BLD-RTO: 0 PER 100 WBC
PLATELET # BLD AUTO: 198 K/UL (ref 150–400)
PMV BLD AUTO: 11.2 FL (ref 8.9–12.9)
POTASSIUM SERPL-SCNC: 3.6 MMOL/L (ref 3.5–5.1)
PROTEUS, PRP: NOT DETECTED
PSEUDOMONAS AERUGINOSA: NOT DETECTED
RBC # BLD AUTO: 4 M/UL (ref 4.1–5.7)
RESISTANT GENE SPACE, REGENE: ABNORMAL
SALMONELLA, SALMO: NOT DETECTED
SAMPLES BEING HELD,HOLD: NORMAL
SERRATIA MARCESCENS: NOT DETECTED
SODIUM SERPL-SCNC: 141 MMOL/L (ref 136–145)
STAPH EPIDERMIDIS, STEP: NOT DETECTED
STAPH LUGDUNENSIS, STALUG: NOT DETECTED
STAPHYLOCOCCUS AUREUS: NOT DETECTED
STAPHYLOCOCCUS, STAPP: NOT DETECTED
STENO MALTOPHILIA, STMA: NOT DETECTED
STREPTOCOCCUS , STPSP: DETECTED
STREPTOCOCCUS AGALACTIAE (GROUP B): DETECTED
STREPTOCOCCUS PNEUMONIAE , SPNP: NOT DETECTED
STREPTOCOCCUS PYOGENES (GROUP A), SPYOP: NOT DETECTED
VANCOMYCIN SERPL-MCNC: 6.3 UG/ML
WBC # BLD AUTO: 22.8 K/UL (ref 4.1–11.1)

## 2022-08-23 PROCEDURE — 80202 ASSAY OF VANCOMYCIN: CPT

## 2022-08-23 PROCEDURE — 99223 1ST HOSP IP/OBS HIGH 75: CPT | Performed by: INTERNAL MEDICINE

## 2022-08-23 PROCEDURE — 74011000250 HC RX REV CODE- 250: Performed by: INTERNAL MEDICINE

## 2022-08-23 PROCEDURE — 74011000258 HC RX REV CODE- 258: Performed by: INTERNAL MEDICINE

## 2022-08-23 PROCEDURE — 74011250636 HC RX REV CODE- 250/636: Performed by: INTERNAL MEDICINE

## 2022-08-23 PROCEDURE — 85027 COMPLETE CBC AUTOMATED: CPT

## 2022-08-23 PROCEDURE — 36415 COLL VENOUS BLD VENIPUNCTURE: CPT

## 2022-08-23 PROCEDURE — 65270000029 HC RM PRIVATE

## 2022-08-23 PROCEDURE — 80048 BASIC METABOLIC PNL TOTAL CA: CPT

## 2022-08-23 RX ADMIN — VANCOMYCIN HYDROCHLORIDE 1250 MG: 1.25 INJECTION, POWDER, LYOPHILIZED, FOR SOLUTION INTRAVENOUS at 11:30

## 2022-08-23 RX ADMIN — SODIUM CHLORIDE, PRESERVATIVE FREE 10 ML: 5 INJECTION INTRAVENOUS at 22:56

## 2022-08-23 RX ADMIN — CEFAZOLIN 2 G: 1 INJECTION, POWDER, FOR SOLUTION INTRAMUSCULAR; INTRAVENOUS at 16:08

## 2022-08-23 RX ADMIN — ENOXAPARIN SODIUM 40 MG: 100 INJECTION SUBCUTANEOUS at 08:34

## 2022-08-23 RX ADMIN — CEFAZOLIN 2 G: 1 INJECTION, POWDER, FOR SOLUTION INTRAMUSCULAR; INTRAVENOUS at 22:55

## 2022-08-23 RX ADMIN — SODIUM CHLORIDE 125 ML/HR: 9 INJECTION, SOLUTION INTRAVENOUS at 22:55

## 2022-08-23 RX ADMIN — CEFEPIME 2 G: 2 INJECTION, POWDER, FOR SOLUTION INTRAVENOUS at 08:33

## 2022-08-23 RX ADMIN — SODIUM CHLORIDE 125 ML/HR: 9 INJECTION, SOLUTION INTRAVENOUS at 18:24

## 2022-08-23 NOTE — PROGRESS NOTES
Robin Solano [facility] 79  380 [facility], [address] [place] [address]  [phone]      Medical Progress Note      NAME: Mendoza Gimenez   :  1965  MRM:  [id number]    Date of service: 2022  6:51 AM       Assessment and Plan:   Left leg cellulitis. Failed outpatient antibiotics treatment twice. check MRSA nasal swab. BL leg doppler is negative for DVT. Cont vancomycin and cefepime and monitor clinically. 2.  Bacteremia/ SIRS. Fever, leukocytosis. BC: strep agalactiae. Likely secondary to above. Continue antibiotics, IV fluids. Consult ID                Subjective:     Chief Complaint[de-identified] Patient was seen and examined as a follow up for cellulitis. Chart was reviewed. he though his leg swelling and pain is better     ROS:  (bold if positive, if negative)    Tolerating PT  Tolerating Diet        Objective:     Last 24hrs VS reviewed since prior progress note.  Most recent are:    Visit Vitals  /78 (BP 1 Location: Right upper arm, BP Patient Position: At rest)   Pulse 79   Temp 100.1 °F (37.8 °C)   Resp 16   Ht 5' 9\" (1.753 m)   Wt 92 kg (202 lb 14.4 oz)   SpO2 98%   BMI 29.96 kg/m²     SpO2 Readings from Last 6 Encounters:   22 98%   22 97%        No intake or output data in the 24 hours ending 22 0651     Physical Exam:    Gen:  Well-developed, well-nourished, in no acute distress  HEENT:  Pink conjunctivae, PERRL, hearing intact to voice, moist mucous membranes  Neck:  Supple, without masses, thyroid non-tender  Resp:  No accessory muscle use, clear breath sounds without wheezes rales or rhonchi  Card:  No murmurs, normal S1, S2 without thrills, bruits or peripheral edema  Abd:  Soft, non-tender, non-distended, normoactive bowel sounds are present, no palpable organomegaly and no detectable hernias  Lymph:  No cervical or inguinal adenopathy  Musc:  No cyanosis or clubbing  Skin:  No rashes or ulcers, skin turgor is good  Neuro:  Cranial nerves are grossly intact, no focal motor weakness, follows commands appropriately  Psych:  Good insight, oriented to person, place and time, alert  __________________________________________________________________  Medications Reviewed: (see below)  Medications:     Current Facility-Administered Medications   Medication Dose Route Frequency    sodium chloride (NS) flush 5-40 mL  5-40 mL IntraVENous Q8H    sodium chloride (NS) flush 5-40 mL  5-40 mL IntraVENous PRN    acetaminophen (TYLENOL) tablet 650 mg  650 mg Oral Q6H PRN    Or    acetaminophen (TYLENOL) suppository 650 mg  650 mg Rectal Q6H PRN    polyethylene glycol (MIRALAX) packet 17 g  17 g Oral DAILY PRN    ondansetron (ZOFRAN ODT) tablet 4 mg  4 mg Oral Q8H PRN    Or    ondansetron (ZOFRAN) injection 4 mg  4 mg IntraVENous Q6H PRN    enoxaparin (LOVENOX) injection 40 mg  40 mg SubCUTAneous DAILY    0.9% sodium chloride infusion  125 mL/hr IntraVENous CONTINUOUS    vancomycin (VANCOCIN) 750 mg in 0.9% sodium chloride 250 mL (VIAL-MATE)  750 mg IntraVENous Q12H    Vancomycin Level 8/23 10am   Other ONCE    cefepime (MAXIPIME) 2 g in 0.9% sodium chloride (MBP/ADV) 100 mL MBP  2 g IntraVENous Q12H        Lab Data Reviewed: (see below)  Lab Review:     Recent Labs     08/23/22  0028 08/21/22 2214   WBC 22.8* 15.4*   HGB 12.1 13.8   HCT 36.5* 40.0    255     Recent Labs     08/23/22  0028 08/21/22 2214    140   K 3.6 4.1   * 104   CO2 26 26   GLU 89 96   BUN 13 13   CREA 1.16 1.10   CA 7.8* 9.2   ALB  --  3.8   TBILI  --  0.4   ALT  --  20   INR  --  1.1     No results found for: GLUCPOC  No results for input(s): PH, PCO2, PO2, HCO3, FIO2 in the last 72 hours.   Recent Labs     08/21/22 2214   INR 1.1     All Micro Results       Procedure Component Value Units Date/Time    CULTURE, MRSA [[id number]]     Order Status: Sent Specimen: Nasal     CULTURE, BLOOD, PERIPHERAL [[id number]]  (Abnormal) Collected: 08/21/22 2214    Order Status: Completed Specimen: Blood Updated: 08/23/22 0105     Special Requests: NO SPECIAL REQUESTS        Culture result:       PROBABLE STREPTOCOCCUS AGALACTIAE SERO GROUP B GROWING IN BOTH BOTTLES DRAWN SITE=LAC            (NOTE) CALLED POSITIVE BLOOD CULTURES OF GPC IN PAC GROWING IN 2 OUT OF 2 BOTTLES TO Hui Iverson RN AT 2324 ON 8/22/22. AT    CULTURE, BLOOD, PERIPHERAL [[id number]]  (Abnormal) Collected: 08/21/22 2214    Order Status: Completed Specimen: Blood Updated: 08/22/22 2331     Special Requests: NO SPECIAL REQUESTS        Culture result:       GRAM POSITIVE COCCI IN PAIRS AND CHAINS GROWING IN BOTH BOTTLES DRAWN SITE=RAC            (NOTE) CALLED POSITIVE BLOOD CULTURES OF GPC IN PAC GROWING IN 2 OUT OF 2 BOTTLES TO Hui Iverson RN AT 2324 ON 8/22/22. AT            I have reviewed notes of prior 24hr. Other pertinent lab: Total time spent with patient: 35 minutes I personally reviewed chart, notes, data and current medications in the medical record. I have personally examined and treated the patient at bedside during this period.                  Care Plan discussed with: Patient, Nursing Staff, and >50% of time spent in counseling and coordination of care    Discussed:  Care Plan    Prophylaxis:  Lovenox    Disposition:  Home w/Family           ___________________________________________________    Attending Physician: Evelio Rowan MD

## 2022-08-23 NOTE — PROGRESS NOTES
Bedside and Verbal shift change report given to Gilles Flores RN (oncoming nurse) by Heidy Marcial RN (offgoing nurse). Report included the following information SBAR, Kardex, MAR, Accordion, Recent Results, and Quality Measures.

## 2022-08-23 NOTE — PROGRESS NOTES
[address] [address] Pharmacy Dosing Services: Antimicrobial Stewardship Daily Doc    Consult for antibiotic dosing of Vancomycin by Dr. Ruth Mead  Indication: sepsis/bacteremia/cellulitis  Day of Therapy: 3    Vancomycin therapy:  Current maintenance dose: vancomycin 750 mg IV every 12 hours   Last level: 6.3 mcg/mL   AUC/MARCELO 306  Target AUC/MARCELO of 400-600  Plan: sub therapeutic dose adjustment: 1250 mg IV every 12 hours - predicted AUC/MARCELO 508  Date Dose & Interval Measured (mcg/mL) Extrapolated (mcg/mL)   ? 8/23/2022 ? 750 mg Q12 ?6.3    ?   ? ? ? ?   ? ? ? ? Non-Kinetic Antimicrobial Dosing Regimen: protocol   CrCl >/=60  Current Regimen:  Cefepime 2 gm EI every 12 hours  Adjustment to therapy: Cefepime 2 gm EI every 8 hours   Cultures 8/23 MRSA screen: in process  8/21 Blood: GBS 2/2 prelim  8/21 Blood: GBS 2/2 prelim   Serum Creatinine Lab Results   Component Value Date/Time    Creatinine 1.16 08/23/2022 12:28 AM         Creatinine Clearance Estimated Creatinine Clearance: 78.7 mL/min (based on SCr of 1.16 mg/dL). Temp Temp: 98.3 °F (36.8 °C)       WBC Lab Results   Component Value Date/Time    WBC 22.8 (H) 08/23/2022 12:28 AM        Procalcitonin No results found for: PCT     For Antifungals, Metronidazole and Nafcillin: Lab Results   Component Value Date/Time    ALT (SGPT) 20 08/21/2022 10:14 PM    AST (SGOT) 29 08/21/2022 10:14 PM    Alk.  phosphatase 77 08/21/2022 10:14 PM    Bilirubin, total 0.4 08/21/2022 10:14 PM        Pharmacist Luisa Bowen

## 2022-08-23 NOTE — PROGRESS NOTES
TRANSFER - OUT REPORT:    Verbal report given to Tosha Thakur (name) on Mian Ty (patient name)  being transferred to [facility](unit) for routine progression of care   Report consisted of patients Situation, Background, Assessment and   Recommendations(SBAR). Transition of Care Plan: RUR-5%  Pt is being medically managed with IV vanc and cefepime  Pt now with bacteremia-ID consulted  Pt was given a list of [facility] providers from which to choose a PCP  CM following for any d/c needs  Family to transport him home at d/c  GIL Kumar

## 2022-08-23 NOTE — PROGRESS NOTES
0700: Bedside and Verbal shift change report given to Tristian Brewer (oncoming nurse) by Jese Ramon RN (offgoing nurse). Report included the following information SBAR, Kardex, Accordion, and Cardiac Rhythm NSR .    1550: TRANSFER - OUT REPORT:    Verbal report given to [facility] (name) on Huan Dos Santos  being transferred to 5th floor (unit) for routine progression of care       Report consisted of patients Situation, Background, Assessment and   Recommendations(SBAR). Information from the following report(s) SBAR, Kardex, Accordion, and Cardiac Rhythm NSR  was reviewed with the receiving nurse. Lines:   Peripheral IV 08/21/22 Left Antecubital (Active)   Site Assessment Clean, dry, & intact 08/23/22 0731   Phlebitis Assessment 0 08/23/22 0731   Infiltration Assessment 0 08/23/22 0731   Dressing Status Clean, dry, & intact 08/23/22 0731   Dressing Type Transparent 08/23/22 0731   Hub Color/Line Status Pink 08/23/22 0731   Action Taken Open ports on tubing capped 08/23/22 0731   Alcohol Cap Used Yes 08/23/22 0731       Peripheral IV 08/21/22 Right Antecubital (Active)   Site Assessment Clean, dry, & intact 08/23/22 0731   Phlebitis Assessment 0 08/23/22 0731   Infiltration Assessment 0 08/23/22 0731   Dressing Status Clean, dry, & intact 08/23/22 0731   Dressing Type Transparent 08/23/22 0731   Hub Color/Line Status Pink 08/23/22 0731   Action Taken Open ports on tubing capped 08/23/22 0731   Alcohol Cap Used Yes 08/23/22 0731        Opportunity for questions and clarification was provided. Patient transported with:   Registered Nurse  Tech             This patient was assisted with Intentional Toileting every 2 hours during this shift as appropriate. Documentation of ambulation and output reflected on Flowsheet as appropriate. Purposeful hourly rounding was completed using AIDET and 5Ps. Outcomes of PHR documented as they occurred. Bed alarm in use as appropriate. Dual Suction and ambubag in place.

## 2022-08-23 NOTE — PROGRESS NOTES
Bedside shift change report given to Juan Pablo Baca (oncoming nurse) by Agata Mondragon (offgoing nurse). Report included the following information SBAR, Kardex, Med Rec Status, and Cardiac Rhythm NSR .     2323: notified from [facility] about blood culture results. Gram + cocci growing in pairs and chains. On call MD Nesbitt notified. No further action or orders required at the moment as patient is already on correct antibiotics per MD.    Patient complaining that right leg is feeling really \"tight\" at the lower part of the leg and foot. Feels very warm to the touch. Not painful for patient but states it feels \"different and tight\" when he walks to the restroom. No complaints while resting in bed. On call MD notified.

## 2022-08-23 NOTE — CONSULTS
Infectious Disease Consult    Impression/Plan   GBS bacteremia due to right lower extremity cellulitis. Repeat blood cultures in a.m. Narrow antibiotics to cefazolin. Check echocardiogram.  Anticipate PICC line and 14 days of IV antibiotics at discharge. Leukocytosis. Due to above. Bilateral lower extremity lymphedema. Will discuss with wound care team.  May benefit from referral to lymphedema clinic  Recurrent lower extremity cellulitis. Check hemoglobin A1c    Anti-infectives:   Vancomycin  Cefepime    Subjective:   Date of Consultation:  August 23, 2022  Date of Admission: 8/21/2022   Referring Physician:     Patient is a 62 y.o. male with no significant past medical history who is being seen for bacteremia. Patient states that he has chronic lower extremity edema. He developed left lower extremity cellulitis approximately a month ago. He was seen at an urgent care center where he was prescribed a 7-day course of doxycycline. There was some improvement however not total resolution. He was seen at another emergency department and given another 10 days of this antibiotic. The left lower extremity cellulitis resolved however once off antibiotics for 5 days he developed right lower extremity cellulitis. He presented to [facility] with complaints of right groin pain due to a swollen lymph node. Work-up is revealed right lower extremity cellulitis and group B streptococcal bacteremia. He is currently on cefepime and vancomycin. The infectious diseases service has been asked to assist with antibiotic management    Patient Active Problem List   Diagnosis Code    Sepsis ([facility] [address] A41.9     History reviewed. No pertinent past medical history. History reviewed. No pertinent family history.    Social History     Tobacco Use    Smoking status: Never    Smokeless tobacco: Never   Substance Use Topics    Alcohol use: Yes     Past Surgical History:   Procedure Laterality Date    HX ORTHOPAEDIC Prior to Admission medications    Not on File     No Known Allergies     Review of Systems:  A comprehensive review of systems was negative except for that written in the History of Present Illness. Objective:   Blood pressure 117/78, pulse 61, temperature 98.3 °F (36.8 °C), resp. rate 16, height 5' 9\" (1.753 m), weight 202 lb 14.4 oz (92 kg), SpO2 98 %. Temp (24hrs), Av.6 °F (37.6 °C), Min:98.3 °F (36.8 °C), Max:101.9 °F (38.8 °C)       Exam:        General:  Alert, cooperative, well noursished, well developed, appears stated age   Eyes:  Sclera anicteric. Mouth/Throat: Mucous membranes normal, oral pharynx clear   Neck: Supple   Lungs:   Clear to auscultation bilaterally   CV:  Regular rate and rhythm,no murmur   Abdomen:   Soft, non-tender, nondistended   Extremities: Bilateral lower extremity edema.   RLE erythema and warmth   Skin: No acute rash on visualized skin   Lymph nodes:    Musculoskeletal: Moves all   Lines/Devices:  Intact, no erythema, drainage or tenderness   Psych: Alert and oriented, normal mood affect given the setting       Data Review:   Recent Results (from the past 24 hour(s))   METABOLIC PANEL, BASIC    Collection Time: 22 12:28 AM   Result Value Ref Range    Sodium 141 136 - 145 mmol/L    Potassium 3.6 3.5 - 5.1 mmol/L    Chloride 111 (H) 97 - 108 mmol/L    CO2 26 21 - 32 mmol/L    Anion gap 4 (L) 5 - 15 mmol/L    Glucose 89 65 - 100 mg/dL    BUN 13 6 - 20 MG/DL    Creatinine 1.16 0.70 - 1.30 MG/DL    BUN/Creatinine ratio 11 (L) 12 - 20      GFR est AA >60 >60 ml/min/1.73m2    GFR est non-AA >60 >60 ml/min/1.73m2    Calcium 7.8 (L) 8.5 - 10.1 MG/DL   CBC W/O DIFF    Collection Time: 22 12:28 AM   Result Value Ref Range    WBC 22.8 (H) 4.1 - 11.1 K/uL    RBC 4.00 (L) 4.10 - 5.70 M/uL    HGB 12.1 12.1 - 17.0 g/dL    HCT 36.5 (L) 36.6 - 50.3 %    MCV 91.3 80.0 - 99.0 FL    MCH 30.3 26.0 - 34.0 PG    MCHC 33.2 30.0 - 36.5 g/dL    RDW 13.2 11.5 - 14.5 %    PLATELET 290 566 - 400 K/uL    MPV 11.2 8.9 - 12.9 FL    NRBC 0.0 0  WBC    ABSOLUTE NRBC 0.00 0.00 - 0.01 K/uL   SAMPLES BEING HELD    Collection Time: 08/23/22 12:28 AM   Result Value Ref Range    SAMPLES BEING HELD 1PST     COMMENT        Add-on orders for these samples will be processed based on acceptable specimen integrity and analyte stability, which may vary by analyte.    VANCOMYCIN, RANDOM    Collection Time: 08/23/22 10:19 AM   Result Value Ref Range    Vancomycin, random 6.3 UG/ML        Microbiology:      Studies:      Signed By: Isaac Alvarez DO     August 23, 2022

## 2022-08-24 ENCOUNTER — APPOINTMENT (OUTPATIENT)
Dept: NON INVASIVE DIAGNOSTICS | Age: 57
DRG: 603 | End: 2022-08-24
Attending: INTERNAL MEDICINE
Payer: COMMERCIAL

## 2022-08-24 LAB
BACTERIA SPEC CULT: NORMAL
BACTERIA SPEC CULT: NORMAL
BASOPHILS # BLD: 0.1 K/UL (ref 0–0.1)
BASOPHILS NFR BLD: 1 % (ref 0–1)
DIFFERENTIAL METHOD BLD: NORMAL
ECHO AO ARCH DIAM: 2.9 CM
ECHO AO ASC DIAM: 3.5 CM
ECHO AO ASCENDING AORTA INDEX: 1.7 CM/M2
ECHO AV AREA PEAK VELOCITY: 2.3 CM2
ECHO AV AREA/BSA PEAK VELOCITY: 1.1 CM2/M2
ECHO AV PEAK GRADIENT: 13 MMHG
ECHO AV PEAK VELOCITY: 1.8 M/S
ECHO AV VELOCITY RATIO: 0.61
ECHO LA DIAMETER INDEX: 1.65 CM/M2
ECHO LA DIAMETER: 3.4 CM
ECHO LA VOL 2C: 35 ML (ref 18–58)
ECHO LA VOL 4C: 39 ML (ref 18–58)
ECHO LA VOL BP: 45 ML (ref 18–58)
ECHO LA VOL/BSA BIPLANE: 22 ML/M2 (ref 16–34)
ECHO LA VOLUME AREA LENGTH: 54 ML
ECHO LA VOLUME INDEX A2C: 17 ML/M2 (ref 16–34)
ECHO LA VOLUME INDEX A4C: 19 ML/M2 (ref 16–34)
ECHO LA VOLUME INDEX AREA LENGTH: 26 ML/M2 (ref 16–34)
ECHO LV E' LATERAL VELOCITY: 7 CM/S
ECHO LV E' SEPTAL VELOCITY: 11 CM/S
ECHO LV FRACTIONAL SHORTENING: 48 % (ref 28–44)
ECHO LV INTERNAL DIMENSION DIASTOLE INDEX: 2.52 CM/M2
ECHO LV INTERNAL DIMENSION DIASTOLIC: 5.2 CM (ref 4.2–5.9)
ECHO LV INTERNAL DIMENSION SYSTOLIC INDEX: 1.31 CM/M2
ECHO LV INTERNAL DIMENSION SYSTOLIC: 2.7 CM
ECHO LV IVSD: 0.8 CM (ref 0.6–1)
ECHO LV MASS 2D: 145.2 G (ref 88–224)
ECHO LV MASS INDEX 2D: 70.5 G/M2 (ref 49–115)
ECHO LV POSTERIOR WALL DIASTOLIC: 0.8 CM (ref 0.6–1)
ECHO LV RELATIVE WALL THICKNESS RATIO: 0.31
ECHO LVOT AREA: 3.8 CM2
ECHO LVOT DIAM: 2.2 CM
ECHO LVOT PEAK GRADIENT: 5 MMHG
ECHO LVOT PEAK VELOCITY: 1.1 M/S
ECHO MV A VELOCITY: 0.84 M/S
ECHO MV E DECELERATION TIME (DT): 201 MS
ECHO MV E VELOCITY: 0.95 M/S
ECHO MV E/A RATIO: 1.13
ECHO MV E/E' LATERAL: 13.57
ECHO MV E/E' RATIO (AVERAGED): 11.1
ECHO MV E/E' SEPTAL: 8.64
ECHO MV REGURGITANT PEAK GRADIENT: 92 MMHG
ECHO MV REGURGITANT PEAK VELOCITY: 4.8 M/S
ECHO PULMONARY ARTERY END DIASTOLIC PRESSURE: 3 MMHG
ECHO PV MAX VELOCITY: 0.9 M/S
ECHO PV PEAK GRADIENT: 3 MMHG
ECHO PV REGURGITANT MAX VELOCITY: 0.9 M/S
ECHO RV FREE WALL PEAK S': 17 CM/S
ECHO RV INTERNAL DIMENSION: 3.9 CM
ECHO RV TAPSE: 2.4 CM (ref 1.7–?)
ECHO TV REGURGITANT MAX VELOCITY: 2.91 M/S
ECHO TV REGURGITANT PEAK GRADIENT: 34 MMHG
EOSINOPHIL # BLD: 0.1 K/UL (ref 0–0.4)
EOSINOPHIL NFR BLD: 1 % (ref 0–7)
ERYTHROCYTE [DISTWIDTH] IN BLOOD BY AUTOMATED COUNT: 13.3 % (ref 11.5–14.5)
HCT VFR BLD AUTO: 39.5 % (ref 36.6–50.3)
HGB BLD-MCNC: 13.5 G/DL (ref 12.1–17)
IMM GRANULOCYTES # BLD AUTO: 0 K/UL (ref 0–0.04)
IMM GRANULOCYTES NFR BLD AUTO: 0 % (ref 0–0.5)
LYMPHOCYTES # BLD: 2.1 K/UL (ref 0.8–3.5)
LYMPHOCYTES NFR BLD: 21 % (ref 12–49)
MCH RBC QN AUTO: 30.9 PG (ref 26–34)
MCHC RBC AUTO-ENTMCNC: 34.2 G/DL (ref 30–36.5)
MCV RBC AUTO: 90.4 FL (ref 80–99)
MONOCYTES # BLD: 1 K/UL (ref 0–1)
MONOCYTES NFR BLD: 9 % (ref 5–13)
NEUTS SEG # BLD: 7.1 K/UL (ref 1.8–8)
NEUTS SEG NFR BLD: 68 % (ref 32–75)
NRBC # BLD: 0 K/UL (ref 0–0.01)
NRBC BLD-RTO: 0 PER 100 WBC
PLATELET # BLD AUTO: 285 K/UL (ref 150–400)
PMV BLD AUTO: 10.3 FL (ref 8.9–12.9)
RBC # BLD AUTO: 4.37 M/UL (ref 4.1–5.7)
SERVICE CMNT-IMP: NORMAL
WBC # BLD AUTO: 10.4 K/UL (ref 4.1–11.1)

## 2022-08-24 PROCEDURE — 85025 COMPLETE CBC W/AUTO DIFF WBC: CPT

## 2022-08-24 PROCEDURE — 93306 TTE W/DOPPLER COMPLETE: CPT | Performed by: INTERNAL MEDICINE

## 2022-08-24 PROCEDURE — 74011000250 HC RX REV CODE- 250: Performed by: INTERNAL MEDICINE

## 2022-08-24 PROCEDURE — 99232 SBSQ HOSP IP/OBS MODERATE 35: CPT | Performed by: INTERNAL MEDICINE

## 2022-08-24 PROCEDURE — 74011250636 HC RX REV CODE- 250/636: Performed by: INTERNAL MEDICINE

## 2022-08-24 PROCEDURE — 65270000029 HC RM PRIVATE

## 2022-08-24 PROCEDURE — 93306 TTE W/DOPPLER COMPLETE: CPT

## 2022-08-24 PROCEDURE — 87040 BLOOD CULTURE FOR BACTERIA: CPT

## 2022-08-24 PROCEDURE — 36415 COLL VENOUS BLD VENIPUNCTURE: CPT

## 2022-08-24 RX ORDER — HYDRALAZINE HYDROCHLORIDE 20 MG/ML
10 INJECTION INTRAMUSCULAR; INTRAVENOUS ONCE
Status: COMPLETED | OUTPATIENT
Start: 2022-08-24 | End: 2022-08-24

## 2022-08-24 RX ADMIN — CEFAZOLIN 2 G: 1 INJECTION, POWDER, FOR SOLUTION INTRAMUSCULAR; INTRAVENOUS at 14:53

## 2022-08-24 RX ADMIN — HYDRALAZINE HYDROCHLORIDE 10 MG: 20 INJECTION, SOLUTION INTRAMUSCULAR; INTRAVENOUS at 12:39

## 2022-08-24 RX ADMIN — SODIUM CHLORIDE, PRESERVATIVE FREE 10 ML: 5 INJECTION INTRAVENOUS at 22:11

## 2022-08-24 RX ADMIN — CEFAZOLIN 2 G: 1 INJECTION, POWDER, FOR SOLUTION INTRAMUSCULAR; INTRAVENOUS at 05:45

## 2022-08-24 RX ADMIN — SODIUM CHLORIDE, PRESERVATIVE FREE 10 ML: 5 INJECTION INTRAVENOUS at 05:39

## 2022-08-24 RX ADMIN — CEFAZOLIN 2 G: 1 INJECTION, POWDER, FOR SOLUTION INTRAMUSCULAR; INTRAVENOUS at 22:10

## 2022-08-24 RX ADMIN — SODIUM CHLORIDE, PRESERVATIVE FREE 10 ML: 5 INJECTION INTRAVENOUS at 14:52

## 2022-08-24 RX ADMIN — ENOXAPARIN SODIUM 40 MG: 100 INJECTION SUBCUTANEOUS at 09:02

## 2022-08-24 NOTE — PROGRESS NOTES
8/24/2022  Case Management Progress Note    1:59 PM  Patient is 62year old male admitted 8/22 with sepsis  Patient's RUR is 5% green/low risk for readmission  Covid test: none this admission  Chart reviewed--patient discussed at IDR rounds  Per report and rounds patient will likely need IV antibiotics. No other skilled needs--referral sent pre-emptively to [facility] and asked them about nursing as well. Will continue to follow and assist with discharge planning as needed.      Transition of Care Plan   Continue medical management/treatment  Patient will need final ID recs and PICC Line for discharge  Referral sent to [facility] with family otherwise  Family will transport at discharge   CM will continue to follow    DUSTIN Marina

## 2022-08-24 NOTE — PROGRESS NOTES
Problem: Falls - Risk of  Goal: *Absence of Falls  Description: Document Nirmala Clements Fall Risk and appropriate interventions in the flowsheet.   Outcome: Progressing Towards Goal  Note: Fall Risk Interventions:            Medication Interventions: Assess postural VS orthostatic hypotension                   Problem: General Infection Care Plan (Adult and Pediatric)  Goal: Improvement in signs and symptoms of infection  Outcome: Progressing Towards Goal  Goal: *Optimize nutritional status  Outcome: Progressing Towards Goal     Problem: Patient Education: Go to Patient Education Activity  Goal: Patient/Family Education  Outcome: Progressing Towards Goal

## 2022-08-24 NOTE — PROGRESS NOTES
[facility] Progress Note           Jeffery Almazan DO    [phone] Office  [phone]  Fax    2022      Assessment & Plan:     GBS bacteremia due to right lower extremity cellulitis. Repeat blood cultures today. Check echocardiogram.  Continue cefazolin anticipate PICC line and 14 days of IV antibiotics at discharge. Leukocytosis. Due to above. No CBC today. Check in a.m. Bilateral lower extremity lymphedema. Discussed with wound care team.  Will refer to lymphedema clinic at discharge   Recurrent lower extremity cellulitis. Check hemoglobin A1c          Subjective:     No complaints. Leg pain is improving    Objective:     Vitals: Visit Vitals  BP (!) 154/96 (BP 1 Location: Left upper arm, BP Patient Position: At rest)   Pulse 68   Temp 98.7 °F (37.1 °C)   Resp 20   Ht 5' 9\" (1.753 m)   Wt 198 lb 3.2 oz (89.9 kg)   SpO2 96%   BMI 29.27 kg/m²        Tmax:  Temp (24hrs), Av.6 °F (37 °C), Min:98.1 °F (36.7 °C), Max:99.1 °F (37.3 °C)      Exam:   Patient is intubated:  no    Physical Examination:   General:  Alert, cooperative, no distress   Head:  Normocephalic, atraumatic. Eyes:  Conjunctivae clear   Neck: Supple       Lungs:   No distress. Chest wall:     Heart:     Abdomen:   non-distended   Extremities: Moves all. RLE erythema improving. Bilateral lower extremity edema   Skin: No acute rash on exposed skin   Neurologic: CNII-XII intact.  Normal strength     Labs:        No lab exists for component: ITNL   Recent Labs     22        Recent Labs     22  0028 22    140   K 3.6 4.1   * 104   CO2 26 26   BUN 13 13   CREA 1.16 1.10   GLU 89 96   ALB  --  3.8   WBC 22.8* 15.4*   HGB 12.1 13.8   HCT 36.5* 40.0    255     Recent Labs     22   INR 1.1   PTP 14.2   APTT 26.1     Needs: urine analysis, urine sodium, protein and creatinine  No results found for: MAYANK, CREAU      Cultures:     No results found for: BELLA  Lab Results   Component Value Date/Time    Culture result: (A) 08/21/2022 10:14 PM     PROBABLE STREPTOCOCCUS AGALACTIAE SERO GROUP B GROWING IN BOTH BOTTLES DRAWN SITE=LAC    Culture result:  08/21/2022 10:14 PM     (NOTE) CALLED POSITIVE BLOOD CULTURES OF GPC IN PAC GROWING IN 2 OUT OF 2 BOTTLES TO Hui Ievrson RN AT 2324 ON 8/22/22. AT    Culture result: (A) 08/21/2022 10:14 PM     PROBABLE STREPTOCOCCI, BETA HEMOLYTIC GROUP B GROWING IN BOTH BOTTLES DRAWN SITE=RAC    Culture result:  08/21/2022 10:14 PM     (NOTE) CALLED POSITIVE BLOOD CULTURES OF GPC IN PAC GROWING IN 2 OUT OF 2 BOTTLES TO Hui Iverson RN AT 2324 ON 8/22/22. AT       Radiology:     Medications       Current Facility-Administered Medications   Medication Dose Route Frequency Last Admin    ceFAZolin (ANCEF) 2 g in sterile water (preservative free) 20 mL IV syringe  2 g IntraVENous Q8H 2 g at 08/24/22 0545    sodium chloride (NS) flush 5-40 mL  5-40 mL IntraVENous Q8H 10 mL at 08/24/22 0539    sodium chloride (NS) flush 5-40 mL  5-40 mL IntraVENous PRN      acetaminophen (TYLENOL) tablet 650 mg  650 mg Oral Q6H  mg at 08/22/22 1534    Or    acetaminophen (TYLENOL) suppository 650 mg  650 mg Rectal Q6H PRN      polyethylene glycol (MIRALAX) packet 17 g  17 g Oral DAILY PRN      ondansetron (ZOFRAN ODT) tablet 4 mg  4 mg Oral Q8H PRN      Or    ondansetron (ZOFRAN) injection 4 mg  4 mg IntraVENous Q6H PRN      enoxaparin (LOVENOX) injection 40 mg  40 mg SubCUTAneous DAILY 40 mg at 08/24/22 1886           Case discussed with:      Heath Lyons DO

## 2022-08-24 NOTE — PROGRESS NOTES
0945: Bedside and Verbal shift change report given to Tristian Brewer (oncoming nurse) by Lorna Gay RN (offgoing nurse). Report included the following information SBAR, Kardex, and Accordion. Notified MD of patient's BP of 165/98.  MD to place orders

## 2022-08-24 NOTE — PROGRESS NOTES
Physician Progress Note      PATIENT:               Deysi Casanova  CSN #:                  [id number]  :                       1965  ADMIT DATE:       2022 9:56 PM  [address] [place] DATE:  RESPONDING  PROVIDER #:        Jose C Caldwell MD          QUERY TEXT:    Pt admitted with RLE cellulitis and bacteremia, noted to have SIRS criteria. If possible, please document in the progress notes and discharge summary if you are evaluating and /or treating any of the following: The medical record reflects the following:  Risk Factors: RLE cellulitis and bacteremia  Clinical Indicators: pt admitted with RLE cellulitis and bacteremia  - H&P notes SIRS with leukocytosis and fever  - WBC 15.4 on admission wtih Temp 100.5, RR >20  - CRP 1.08  - sepsis noted as working dx by ED provider  Treatment: NS bolus (1L), ancef 2 g IV, maxipime 2 g IV, ID consult, monitoring      Thank you,    Juan Salmeron RN  CDI  Options provided:  -- Sepsis, present on admission  -- Sepsis was ruled out  -- Other - I will add my own diagnosis  -- Disagree - Not applicable / Not valid  -- Disagree - Clinically unable to determine / Unknown  -- Refer to Clinical Documentation Reviewer    PROVIDER RESPONSE TEXT:    After further study, sepsis was ruled out for this patient.     Query created by: Minoo Fuentes on 2022 4:27 PM      Electronically signed by:  Jose C Caldwell MD 2022 4:47 PM

## 2022-08-24 NOTE — PROGRESS NOTES
Robin Solano [facility] 79  [address] [facility], [address] [place] [address]  [phone]      Medical Progress Note      NAME: Melba Gregorio   :  1965  MRM:  [id number]    Date of service: 2022  6:51 AM       Assessment and Plan:   Left leg cellulitis. Failed outpatient antibiotics treatment twice. check MRSA nasal swab. BL leg doppler is negative for DVT. Was on vancomycin and cefepime. ABx narrowed down by ID and is on ancef         2. Bacteremia/ SIRS. Fever, leukocytosis. BC: strep agalactiae. Likely secondary to above. Continue antibiotics as above. ID following. Repeat BC. Likely needs PICC line and IV ABx for 14 days     3. BL lower extremities lymphedema. Needs to FU at lymphedema clinic. Subjective:     Chief Complaint[de-identified] Patient was seen and examined as a follow up for cellulitis. Chart was reviewed. he though his leg swelling and pain is getting better     ROS:  (bold if positive, if negative)    Tolerating PT  Tolerating Diet        Objective:     Last 24hrs VS reviewed since prior progress note. Most recent are:    Visit Vitals  BP (!) 154/96 (BP 1 Location: Left upper arm, BP Patient Position: At rest)   Pulse 68   Temp 98.7 °F (37.1 °C)   Resp 20   Ht 5' 9\" (1.753 m)   Wt 89.9 kg (198 lb 3.2 oz)   SpO2 96%   BMI 29.27 kg/m²     SpO2 Readings from Last 6 Encounters:   22 96%   22 97%          Intake/Output Summary (Last 24 hours) at 2022 1002  Last data filed at 2022 0906  Gross per 24 hour   Intake 500 ml   Output --   Net 500 ml          Physical Exam:    Gen:  Well-developed, well-nourished, in no acute distress  HEENT:  Pink conjunctivae, PERRL, hearing intact to voice, moist mucous membranes  Neck:  Supple, without masses, thyroid non-tender  Resp:  No accessory muscle use, clear breath sounds without wheezes rales or rhonchi  Card:  No murmurs, normal S1, S2 without thrills, bruits.  ++ peripheral edema  Abd:  Soft, non-tender, non-distended, normoactive bowel sounds are present, no palpable organomegaly and no detectable hernias  Lymph:  No cervical or inguinal adenopathy  Musc:  No cyanosis or clubbing  Skin:  No rashes or ulcers, skin turgor is good  Neuro:  Cranial nerves are grossly intact, no focal motor weakness, follows commands appropriately  Psych:  Good insight, oriented to person, place and time, alert  __________________________________________________________________  Medications Reviewed: (see below)  Medications:     Current Facility-Administered Medications   Medication Dose Route Frequency    ceFAZolin (ANCEF) 2 g in sterile water (preservative free) 20 mL IV syringe  2 g IntraVENous Q8H    sodium chloride (NS) flush 5-40 mL  5-40 mL IntraVENous Q8H    sodium chloride (NS) flush 5-40 mL  5-40 mL IntraVENous PRN    acetaminophen (TYLENOL) tablet 650 mg  650 mg Oral Q6H PRN    Or    acetaminophen (TYLENOL) suppository 650 mg  650 mg Rectal Q6H PRN    polyethylene glycol (MIRALAX) packet 17 g  17 g Oral DAILY PRN    ondansetron (ZOFRAN ODT) tablet 4 mg  4 mg Oral Q8H PRN    Or    ondansetron (ZOFRAN) injection 4 mg  4 mg IntraVENous Q6H PRN    enoxaparin (LOVENOX) injection 40 mg  40 mg SubCUTAneous DAILY    0.9% sodium chloride infusion  125 mL/hr IntraVENous CONTINUOUS        Lab Data Reviewed: (see below)  Lab Review:     Recent Labs     08/23/22  0028 08/21/22 2214   WBC 22.8* 15.4*   HGB 12.1 13.8   HCT 36.5* 40.0    255       Recent Labs     08/23/22  0028 08/21/22 2214    140   K 3.6 4.1   * 104   CO2 26 26   GLU 89 96   BUN 13 13   CREA 1.16 1.10   CA 7.8* 9.2   ALB  --  3.8   TBILI  --  0.4   ALT  --  20   INR  --  1.1       No results found for: GLUCPOC  No results for input(s): PH, PCO2, PO2, HCO3, FIO2 in the last 72 hours.   Recent Labs     08/21/22  2214   INR 1.1       All Micro Results       Procedure Component Value Units Date/Time    CULTURE, MRSA [[id number]] Collected: 08/23/22 1010 Order Status: Sent Specimen: Nasal Updated: 08/23/22 1350    CULTURE, BLOOD, PERIPHERAL [[id number]]  (Abnormal) Collected: 08/21/22 2214    Order Status: Completed Specimen: Blood Updated: 08/23/22 0713     Special Requests: NO SPECIAL REQUESTS        Culture result:       PROBABLE STREPTOCOCCI, BETA HEMOLYTIC GROUP B GROWING IN BOTH BOTTLES DRAWN SITE=RAC            (NOTE) CALLED POSITIVE BLOOD CULTURES OF GPC IN PAC GROWING IN 2 OUT OF 2 BOTTLES TO Liseth Ferguson RN AT 2324 ON 8/22/22. AT    CULTURE, BLOOD, PERIPHERAL [[id number]]  (Abnormal) Collected: 08/21/22 2214    Order Status: Completed Specimen: Blood Updated: 08/23/22 0105     Special Requests: NO SPECIAL REQUESTS        Culture result:       PROBABLE STREPTOCOCCUS AGALACTIAE SERO GROUP B GROWING IN BOTH BOTTLES DRAWN SITE=LAC            (NOTE) CALLED POSITIVE BLOOD CULTURES OF GPC IN PAC GROWING IN 2 OUT OF 2 BOTTLES TO Liseth Ferguson RN AT 2324 ON 8/22/22. AT            I have reviewed notes of prior 24hr. Other pertinent lab: Total time spent with patient: 35 minutes I personally reviewed chart, notes, data and current medications in the medical record. I have personally examined and treated the patient at bedside during this period.                  Care Plan discussed with: Patient, Nursing Staff, and >50% of time spent in counseling and coordination of care    Discussed:  Care Plan    Prophylaxis:  Lovenox    Disposition:  Home w/Family           ___________________________________________________    Attending Physician: Lalito Mcnally MD

## 2022-08-25 ENCOUNTER — APPOINTMENT (OUTPATIENT)
Dept: GENERAL RADIOLOGY | Age: 57
DRG: 603 | End: 2022-08-25
Attending: INTERNAL MEDICINE
Payer: COMMERCIAL

## 2022-08-25 LAB
ANION GAP SERPL CALC-SCNC: 7 MMOL/L (ref 5–15)
BACTERIA SPEC CULT: ABNORMAL
BASOPHILS # BLD: 0.1 K/UL (ref 0–0.1)
BASOPHILS NFR BLD: 1 % (ref 0–1)
BUN SERPL-MCNC: 9 MG/DL (ref 6–20)
BUN/CREAT SERPL: 10 (ref 12–20)
CALCIUM SERPL-MCNC: 8.4 MG/DL (ref 8.5–10.1)
CHLORIDE SERPL-SCNC: 109 MMOL/L (ref 97–108)
CO2 SERPL-SCNC: 26 MMOL/L (ref 21–32)
CREAT SERPL-MCNC: 0.9 MG/DL (ref 0.7–1.3)
CREAT SERPL-MCNC: 0.91 MG/DL (ref 0.7–1.3)
DIFFERENTIAL METHOD BLD: NORMAL
EOSINOPHIL # BLD: 0.2 K/UL (ref 0–0.4)
EOSINOPHIL NFR BLD: 2 % (ref 0–7)
ERYTHROCYTE [DISTWIDTH] IN BLOOD BY AUTOMATED COUNT: 13.1 % (ref 11.5–14.5)
EST. AVERAGE GLUCOSE BLD GHB EST-MCNC: 111 MG/DL
GLUCOSE SERPL-MCNC: 90 MG/DL (ref 65–100)
HBA1C MFR BLD: 5.5 % (ref 4–5.6)
HCT VFR BLD AUTO: 37.1 % (ref 36.6–50.3)
HGB BLD-MCNC: 12.7 G/DL (ref 12.1–17)
IMM GRANULOCYTES # BLD AUTO: 0 K/UL (ref 0–0.04)
IMM GRANULOCYTES NFR BLD AUTO: 0 % (ref 0–0.5)
LYMPHOCYTES # BLD: 2.1 K/UL (ref 0.8–3.5)
LYMPHOCYTES NFR BLD: 29 % (ref 12–49)
MCH RBC QN AUTO: 30.4 PG (ref 26–34)
MCHC RBC AUTO-ENTMCNC: 34.2 G/DL (ref 30–36.5)
MCV RBC AUTO: 88.8 FL (ref 80–99)
MONOCYTES # BLD: 0.8 K/UL (ref 0–1)
MONOCYTES NFR BLD: 11 % (ref 5–13)
NEUTS SEG # BLD: 4.2 K/UL (ref 1.8–8)
NEUTS SEG NFR BLD: 57 % (ref 32–75)
NRBC # BLD: 0 K/UL (ref 0–0.01)
NRBC BLD-RTO: 0 PER 100 WBC
PLATELET # BLD AUTO: 271 K/UL (ref 150–400)
PMV BLD AUTO: 10.3 FL (ref 8.9–12.9)
POTASSIUM SERPL-SCNC: 3.7 MMOL/L (ref 3.5–5.1)
RBC # BLD AUTO: 4.18 M/UL (ref 4.1–5.7)
SERVICE CMNT-IMP: ABNORMAL
SERVICE CMNT-IMP: ABNORMAL
SODIUM SERPL-SCNC: 142 MMOL/L (ref 136–145)
WBC # BLD AUTO: 7.4 K/UL (ref 4.1–11.1)

## 2022-08-25 PROCEDURE — 74011000250 HC RX REV CODE- 250: Performed by: INTERNAL MEDICINE

## 2022-08-25 PROCEDURE — 77030018786 HC NDL GD F/USND BARD -B

## 2022-08-25 PROCEDURE — 2709999900 HC NON-CHARGEABLE SUPPLY

## 2022-08-25 PROCEDURE — 02HV33Z INSERTION OF INFUSION DEVICE INTO SUPERIOR VENA CAVA, PERCUTANEOUS APPROACH: ICD-10-PCS | Performed by: INTERNAL MEDICINE

## 2022-08-25 PROCEDURE — 83036 HEMOGLOBIN GLYCOSYLATED A1C: CPT

## 2022-08-25 PROCEDURE — 74011250636 HC RX REV CODE- 250/636: Performed by: INTERNAL MEDICINE

## 2022-08-25 PROCEDURE — 77030027138 HC INCENT SPIROMETER -A

## 2022-08-25 PROCEDURE — 99232 SBSQ HOSP IP/OBS MODERATE 35: CPT | Performed by: INTERNAL MEDICINE

## 2022-08-25 PROCEDURE — 80048 BASIC METABOLIC PNL TOTAL CA: CPT

## 2022-08-25 PROCEDURE — 36415 COLL VENOUS BLD VENIPUNCTURE: CPT

## 2022-08-25 PROCEDURE — 36573 INSJ PICC RS&I 5 YR+: CPT | Performed by: INTERNAL MEDICINE

## 2022-08-25 PROCEDURE — 65270000029 HC RM PRIVATE

## 2022-08-25 PROCEDURE — 76937 US GUIDE VASCULAR ACCESS: CPT

## 2022-08-25 PROCEDURE — C1751 CATH, INF, PER/CENT/MIDLINE: HCPCS

## 2022-08-25 PROCEDURE — 85025 COMPLETE CBC W/AUTO DIFF WBC: CPT

## 2022-08-25 RX ORDER — HYDRALAZINE HYDROCHLORIDE 20 MG/ML
20 INJECTION INTRAMUSCULAR; INTRAVENOUS
Status: DISCONTINUED | OUTPATIENT
Start: 2022-08-25 | End: 2022-08-26 | Stop reason: HOSPADM

## 2022-08-25 RX ORDER — HYDROMORPHONE HYDROCHLORIDE 1 MG/ML
0.5 INJECTION, SOLUTION INTRAMUSCULAR; INTRAVENOUS; SUBCUTANEOUS
Status: DISCONTINUED | OUTPATIENT
Start: 2022-08-25 | End: 2022-08-26 | Stop reason: HOSPADM

## 2022-08-25 RX ORDER — OXYCODONE HYDROCHLORIDE 5 MG/1
5 TABLET ORAL
Status: DISCONTINUED | OUTPATIENT
Start: 2022-08-25 | End: 2022-08-26 | Stop reason: HOSPADM

## 2022-08-25 RX ADMIN — ENOXAPARIN SODIUM 40 MG: 100 INJECTION SUBCUTANEOUS at 08:31

## 2022-08-25 RX ADMIN — CEFAZOLIN 2 G: 1 INJECTION, POWDER, FOR SOLUTION INTRAMUSCULAR; INTRAVENOUS at 05:48

## 2022-08-25 RX ADMIN — SODIUM CHLORIDE, PRESERVATIVE FREE 10 ML: 5 INJECTION INTRAVENOUS at 05:47

## 2022-08-25 RX ADMIN — CEFAZOLIN 2 G: 1 INJECTION, POWDER, FOR SOLUTION INTRAMUSCULAR; INTRAVENOUS at 13:54

## 2022-08-25 RX ADMIN — CEFAZOLIN 2 G: 1 INJECTION, POWDER, FOR SOLUTION INTRAMUSCULAR; INTRAVENOUS at 21:59

## 2022-08-25 RX ADMIN — SODIUM CHLORIDE, PRESERVATIVE FREE 10 ML: 5 INJECTION INTRAVENOUS at 13:54

## 2022-08-25 RX ADMIN — SODIUM CHLORIDE, PRESERVATIVE FREE 5 ML: 5 INJECTION INTRAVENOUS at 22:00

## 2022-08-25 NOTE — PROGRESS NOTES
Bedside and Verbal shift change report given to Rashad Alcocer RN (oncoming nurse) by Tahir Triana RN (offgoing nurse). Report included the following information SBAR, Kardex, Intake/Output, MAR, and Recent Results.

## 2022-08-25 NOTE — PROGRESS NOTES
Problem: Falls - Risk of  Goal: *Absence of Falls  Description: Document Donato Pryor Fall Risk and appropriate interventions in the flowsheet.   Outcome: Progressing Towards Goal  Note: Fall Risk Interventions:            Medication Interventions: Assess postural VS orthostatic hypotension                   Problem: General Infection Care Plan (Adult and Pediatric)  Goal: Improvement in signs and symptoms of infection  Outcome: Progressing Towards Goal  Goal: *Optimize nutritional status  Outcome: Progressing Towards Goal     Problem: Patient Education: Go to Patient Education Activity  Goal: Patient/Family Education  Outcome: Progressing Towards Goal

## 2022-08-25 NOTE — PROGRESS NOTES
[facility] Progress Note           Orlando Rutherford DO    [phone] Office  [phone]  Fax    2022      Assessment & Plan:     GBS bacteremia due to right lower extremity cellulitis. Repeat blood cultures NGSF. TTE negative for vegetation. Continue cefazolin anticipate PICC line and 14 days of IV antibiotics at discharge. Tentative IV antibiotic orders are in the chart  Leukocytosis. Resolved. Bilateral lower extremity lymphedema. Discussed with wound care team.  Will refer to lymphedema clinic at discharge [employer] lymphedema clinic [phone]  Recurrent lower extremity cellulitis. Hemoglobin A1c 5.5           Subjective:     No complaints. Leg pain is improving    Objective:     Vitals: Visit Vitals  BP (!) 162/99 Comment: PRN hydralazine for SBP > 170   Pulse 63   Temp 98 °F (36.7 °C)   Resp 16   Ht 5' 9\" (1.753 m)   Wt 198 lb 3.1 oz (89.9 kg)   SpO2 98%   BMI 29.27 kg/m²          Tmax:  Temp (24hrs), Av.6 °F (37 °C), Min:98 °F (36.7 °C), Max:99.6 °F (37.6 °C)      Exam:   Patient is intubated:  no    Physical Examination:   General:  Alert, cooperative, no distress   Head:  Normocephalic, atraumatic. Eyes:  Conjunctivae clear   Neck: Supple       Lungs:   No distress. Chest wall:     Heart:     Abdomen:   non-distended   Extremities: Moves all. RLE erythema improving. Bilateral lower extremity edema   Skin: No acute rash on exposed skin   Neurologic: CNII-XII intact. Normal strength     Labs:        No lab exists for component: ITNL   No results for input(s): CPK, CKMB, TROIQ in the last 72 hours.     Recent Labs     22  0237 22  1225 22  0028     --  141   K 3.7  --  3.6   *  --  111*   CO2 26  --  26   BUN 9  --  13   CREA 0.91  0.90  --  1.16   GLU 90  --  89   WBC 7.4 10.4 22.8*   HGB 12.7 13.5 12.1   HCT 37.1 39.5 36.5*    285 198       No results for input(s): INR, PTP, APTT, INREXT, INREXT in the last 72 hours.    Needs: urine analysis, urine sodium, protein and creatinine  No results found for: MAYANK, CREAU      Cultures:     No results found for: BELLA  Lab Results   Component Value Date/Time    Culture result: NO GROWTH AFTER 16 HOURS 08/24/2022 12:25 PM    Culture result: MRSA NOT PRESENT 08/23/2022 10:10 AM    Culture result:  08/23/2022 10:10 AM     Screening of patient nares for MRSA is for surveillance purposes and, if positive, to facilitate isolation considerations in high risk settings. It is not intended for automatic decolonization interventions per se as regimens are not sufficiently effective to warrant routine use.          Radiology:     Medications       Current Facility-Administered Medications   Medication Dose Route Frequency Last Admin    oxyCODONE IR (ROXICODONE) tablet 5 mg  5 mg Oral Q4H PRN      HYDROmorphone (DILAUDID) syringe 0.5 mg  0.5 mg IntraVENous Q4H PRN      hydrALAZINE (APRESOLINE) 20 mg/mL injection 20 mg  20 mg IntraVENous Q6H PRN      ceFAZolin (ANCEF) 2 g in sterile water (preservative free) 20 mL IV syringe  2 g IntraVENous Q8H 2 g at 08/25/22 1354    sodium chloride (NS) flush 5-40 mL  5-40 mL IntraVENous Q8H 10 mL at 08/25/22 1354    sodium chloride (NS) flush 5-40 mL  5-40 mL IntraVENous PRN      acetaminophen (TYLENOL) tablet 650 mg  650 mg Oral Q6H  mg at 08/22/22 1534    Or    acetaminophen (TYLENOL) suppository 650 mg  650 mg Rectal Q6H PRN      polyethylene glycol (MIRALAX) packet 17 g  17 g Oral DAILY PRN      ondansetron (ZOFRAN ODT) tablet 4 mg  4 mg Oral Q8H PRN      Or    ondansetron (ZOFRAN) injection 4 mg  4 mg IntraVENous Q6H PRN      enoxaparin (LOVENOX) injection 40 mg  40 mg SubCUTAneous DAILY 40 mg at 08/25/22 0831           Case discussed with:      Jeffery Almazan DO

## 2022-08-25 NOTE — PROGRESS NOTES
Robin Solano [facility] 79  [address] [place] [address]  [phone]      Medical Progress Note      NAME: Aayush Marshall   :  1965  MRM:  [id number]    Date/Time of service: 2022  11:31 AM       Subjective:     Chief Complaint:  Patient was personally seen and examined by me during this time period. Chart reviewed. No fevers, chills       Objective:       Vitals:       Last 24hrs VS reviewed since prior progress note.  Most recent are:    Visit Vitals  BP (!) 162/99 (BP 1 Location: Left upper arm, BP Patient Position: At rest;Sitting)   Pulse 63   Temp 98 °F (36.7 °C)   Resp 16   Ht 5' 9\" (1.753 m)   Wt 89.9 kg (198 lb 3.1 oz)   SpO2 98%   BMI 29.27 kg/m²     SpO2 Readings from Last 6 Encounters:   22 98%   22 97%          Intake/Output Summary (Last 24 hours) at 2022 1131  Last data filed at 2022 0322  Gross per 24 hour   Intake 125 ml   Output 0 ml   Net 125 ml        Exam:     Physical Exam:    Gen:  Well-developed, well-nourished, in no acute distress  HEENT:  Pink conjunctivae, PERRL, hearing intact to voice, moist mucous membranes  Neck:  Supple, without masses, thyroid non-tender  Resp:  No accessory muscle use, clear breath sounds without wheezes rales or rhonchi  Card:  No murmurs, normal S1, S2 without thrills, +lymphedema  Abd:  Soft, non-tender, non-distended, normoactive bowel sounds are present  Musc:  No cyanosis or clubbing  Skin:  mild bilateral LE erythema  Neuro:  Cranial nerves 3-12 are grossly intact, follows commands appropriately  Psych:  Good insight, oriented to person, place and time, alert    Medications Reviewed: (see below)    Lab Data Reviewed: (see below)    ______________________________________________________________________    Medications:     Current Facility-Administered Medications   Medication Dose Route Frequency    oxyCODONE IR (ROXICODONE) tablet 5 mg  5 mg Oral Q4H PRN    HYDROmorphone (DILAUDID) syringe 0.5 mg  0.5 mg IntraVENous Q4H PRN    hydrALAZINE (APRESOLINE) 20 mg/mL injection 20 mg  20 mg IntraVENous Q6H PRN    ceFAZolin (ANCEF) 2 g in sterile water (preservative free) 20 mL IV syringe  2 g IntraVENous Q8H    sodium chloride (NS) flush 5-40 mL  5-40 mL IntraVENous Q8H    sodium chloride (NS) flush 5-40 mL  5-40 mL IntraVENous PRN    acetaminophen (TYLENOL) tablet 650 mg  650 mg Oral Q6H PRN    Or    acetaminophen (TYLENOL) suppository 650 mg  650 mg Rectal Q6H PRN    polyethylene glycol (MIRALAX) packet 17 g  17 g Oral DAILY PRN    ondansetron (ZOFRAN ODT) tablet 4 mg  4 mg Oral Q8H PRN    Or    ondansetron (ZOFRAN) injection 4 mg  4 mg IntraVENous Q6H PRN    enoxaparin (LOVENOX) injection 40 mg  40 mg SubCUTAneous DAILY          Lab Review:     Recent Labs     08/25/22  0237 08/24/22  1225 08/23/22  0028   WBC 7.4 10.4 22.8*   HGB 12.7 13.5 12.1   HCT 37.1 39.5 36.5*    285 198     Recent Labs     08/25/22  0237 08/23/22  0028    141   K 3.7 3.6   * 111*   CO2 26 26   GLU 90 89   BUN 9 13   CREA 0.91  0.90 1.16   CA 8.4* 7.8*     No results found for: GLUCPOC       Assessment / Plan:     63 yo hx of lymphedema, presented w/ BLE cellulitis, strep bacteremia    1) BLE cellulitis: sepsis POA, now resolved. Infection due to lymphedema. Failed outpatient abx. LE dopplers neg for DVT. Will cont IV ancef. Use IV dilaudid prn severe pan    2) Strep bacteremia: due to above. Echo pending. ID following, plan for home IV abx. Will order PICC line    3) HTN: not on meds. Will use IV hydralazine prn. Consider starting lisinopril on discharge.   Avoid norvasc    4) BLE lymphedema: will need to follow up with lymphedema clinic    Total time spent with patient care: 30 Minutes **I personally saw and examined the patient during this time period**                 Care Plan discussed with: Patient, nursing    Discussed:  Care Plan    Prophylaxis:  Lovenox    Disposition:  Home w/Family           ___________________________________________________    Attending Physician: Darin Piña MD

## 2022-08-25 NOTE — PROGRESS NOTES
Post Discharge PICC and Antibiotic Orders    1. Diagnosis: GBS bacteremia due to lower extremity cellulitis  2. Antibiotic: Ceftriaxone 2 g IV daily through 9/6/2022  3. Routine PICC/ Patti/ Portacath Care including PRN catheter flow management  4. Weekly labs:   _x__CBC/diff/platelets   __x_BUN/Creatinine   ___CPK   __x_AST/TotalBilirubin/AlkalinePhosphatase  5. Fax lab to [phone]  Call Critical Lab Values to [phone]  6. May send to IR for line evaluation or replacement [phone]  7. Home Health to pull PIC line at end of therapy or send to IR for Patti removal  8.   Allergies:  No Known Allergies        Nile Portuguese, DO

## 2022-08-25 NOTE — PROGRESS NOTES
8/25/2022  Case Management Progress Note    11:53 AM  Patient is 62year old male admitted 8/22 with sepsis  Patient's RUR is 4% green/low risk for readmission  Covid test: none this admission  Chart reviewed--patient discussed at IDR rounds  Patient is still pending culture results for PICC placement and IV antibiotics. Referral is already out to [facility] and they can take patient in their office for the nursing if he is agreeable. Will discuss all this with patient once plan is more certain. Will continue to follow and assist with discharge planning.      Transition of Care Plan   Continue medical management/treatment  Home with family and IV antibiotics when ready   Needs PICC line  Family will transport at discharge  CM will continue to follow    DUSTIN Devlin

## 2022-08-25 NOTE — PROGRESS NOTES
PICC Placement Note    PRE-PROCEDURE VERIFICATION  Correct Procedure: yes  Correct Site:  yes  Temperature: Temp: 98.1 °F (36.7 °C), Temperature Source: Temp Source: Oral  Recent Labs     08/25/22  0237   BUN 9   CREA 0.91  0.90      WBC 7.4     Allergies: Patient has no known allergies. Education materials for PICC Care given: yes. See Patient Education activity for further details. PICC Booklet placed at bedside: yes    Closed Ended PICC Catheters:  Flush Lumens as Follows:  Intermittent Medication:   Flush before and after each medication with 10 ml NS. Unused Ports:  Flush every 8 hours with 10 ml NS.  TPN Ports:  Flush every 24 hours with 20 ml NS prior to hanging new bag. Blood Draws: Stop infusion, draw off and waste 10 ml of blood. Draw sample with 10cc syringe or greater. DO NOT USE VACUTAINER . Transfer with appropriate device to lab  tubes. Flush with 20 ml NS. Dressing Change:  Every 7 days, and PRN using sterile technique if integrity of dressing is compromised. Initial dressing change for central line 24-48 hours post insertion if gauze is used. Apply new dressing per policy. PROCEDURE DETAIL  Consent was obtained and all questions were answered related to risks and benefits. A single lumen PICC line was inserted, as a sterile procedure using ultrasound and modified Seldinger technique for antibiotic therapy. The following documentation is in addition to the PICC properties in the lines/airways flowsheet :  Lot [id number]  Lidocaine 1% administered intradermally :yes  Internal Catheter Total Length: 45 (cm)  Vein Selection for PICC:right brachial  Central Line Bundle followed yes  Complication Related to Insertion:no    The placement was verified by EKG, MAX P WAVE @ 45 (cm) 0 (cm) out PER EKG PICC TIP @ C/A junction.       Line is okay to use: yes    Wilmar May RN

## 2022-08-25 NOTE — PROGRESS NOTES
Bedside, Verbal, and Written shift change report given to RN (oncoming nurse) by Eldon Sanchez RN (offgoing nurse). Report included the following information SBAR, Kardex, Procedure Summary, Intake/Output, MAR, Recent Results, Med Rec Status, Alarm Parameters , Pre Procedure Checklist, Procedure Verification, and Quality Measures.

## 2022-08-26 VITALS
BODY MASS INDEX: 29.06 KG/M2 | HEIGHT: 69 IN | SYSTOLIC BLOOD PRESSURE: 169 MMHG | WEIGHT: 196.21 LBS | OXYGEN SATURATION: 98 % | TEMPERATURE: 98 F | HEART RATE: 61 BPM | DIASTOLIC BLOOD PRESSURE: 76 MMHG | RESPIRATION RATE: 16 BRPM

## 2022-08-26 LAB
ANION GAP SERPL CALC-SCNC: 5 MMOL/L (ref 5–15)
BUN SERPL-MCNC: 11 MG/DL (ref 6–20)
BUN/CREAT SERPL: 12 (ref 12–20)
CALCIUM SERPL-MCNC: 8.1 MG/DL (ref 8.5–10.1)
CHLORIDE SERPL-SCNC: 108 MMOL/L (ref 97–108)
CO2 SERPL-SCNC: 26 MMOL/L (ref 21–32)
CREAT SERPL-MCNC: 0.95 MG/DL (ref 0.7–1.3)
ERYTHROCYTE [DISTWIDTH] IN BLOOD BY AUTOMATED COUNT: 12.8 % (ref 11.5–14.5)
GLUCOSE SERPL-MCNC: 140 MG/DL (ref 65–100)
HCT VFR BLD AUTO: 35.9 % (ref 36.6–50.3)
HGB BLD-MCNC: 12.5 G/DL (ref 12.1–17)
MAGNESIUM SERPL-MCNC: 1.9 MG/DL (ref 1.6–2.4)
MCH RBC QN AUTO: 30.6 PG (ref 26–34)
MCHC RBC AUTO-ENTMCNC: 34.8 G/DL (ref 30–36.5)
MCV RBC AUTO: 88 FL (ref 80–99)
NRBC # BLD: 0 K/UL (ref 0–0.01)
NRBC BLD-RTO: 0 PER 100 WBC
PHOSPHATE SERPL-MCNC: 3 MG/DL (ref 2.6–4.7)
PLATELET # BLD AUTO: 309 K/UL (ref 150–400)
PMV BLD AUTO: 9.8 FL (ref 8.9–12.9)
POTASSIUM SERPL-SCNC: 3.5 MMOL/L (ref 3.5–5.1)
RBC # BLD AUTO: 4.08 M/UL (ref 4.1–5.7)
SODIUM SERPL-SCNC: 139 MMOL/L (ref 136–145)
WBC # BLD AUTO: 6.6 K/UL (ref 4.1–11.1)

## 2022-08-26 PROCEDURE — 83735 ASSAY OF MAGNESIUM: CPT

## 2022-08-26 PROCEDURE — 74011250636 HC RX REV CODE- 250/636: Performed by: INTERNAL MEDICINE

## 2022-08-26 PROCEDURE — 74011000250 HC RX REV CODE- 250: Performed by: INTERNAL MEDICINE

## 2022-08-26 PROCEDURE — 36415 COLL VENOUS BLD VENIPUNCTURE: CPT

## 2022-08-26 PROCEDURE — 99232 SBSQ HOSP IP/OBS MODERATE 35: CPT | Performed by: INTERNAL MEDICINE

## 2022-08-26 PROCEDURE — 84100 ASSAY OF PHOSPHORUS: CPT

## 2022-08-26 PROCEDURE — 80048 BASIC METABOLIC PNL TOTAL CA: CPT

## 2022-08-26 PROCEDURE — 85027 COMPLETE CBC AUTOMATED: CPT

## 2022-08-26 RX ORDER — CEPHALEXIN 500 MG/1
500 CAPSULE ORAL 4 TIMES DAILY
Qty: 40 CAPSULE | Refills: 0 | Status: SHIPPED | OUTPATIENT
Start: 2022-08-26 | End: 2022-09-05

## 2022-08-26 RX ADMIN — ENOXAPARIN SODIUM 40 MG: 100 INJECTION SUBCUTANEOUS at 08:46

## 2022-08-26 RX ADMIN — SODIUM CHLORIDE, PRESERVATIVE FREE 10 ML: 5 INJECTION INTRAVENOUS at 06:22

## 2022-08-26 RX ADMIN — CEFTRIAXONE SODIUM 2 G: 2 INJECTION, POWDER, FOR SOLUTION INTRAMUSCULAR; INTRAVENOUS at 10:32

## 2022-08-26 RX ADMIN — CEFAZOLIN 2 G: 1 INJECTION, POWDER, FOR SOLUTION INTRAMUSCULAR; INTRAVENOUS at 06:22

## 2022-08-26 NOTE — PROGRESS NOTES
8/26/2022  Case Management Progress Note     11:18 AM  Mihir Buchanan will teach and deliver ASAP once patient's wife gets here. Once teaching and first dose are finished patient can discharge home. DUSTIN Multani    8:56 AM  Patient is 62year old male admitted 8/22 with sepsis  Patient's RUR is 8% green/low risk for readmission  Covid test: none this admission  Chart reviewed  Per attending patient is likely ready for discharge today as long as antibiotic orders are still current. I've sent them to [place] [id number] for pricing and patient will need a first dose here. Home nursing would be difficult to staff due to patient's insurance, however he can go to [facility] office for dressing changes as needed (likely only once due to antibiotic course length). Will continue to follow and update.      Transition of Care Plan   Continue medical management  Home with family and IV antibiotics, likely today   PICC line in, [place] [id number] running costs  Family will transport at discharge  CM will continue to follow    DUSTIN Multani

## 2022-08-26 NOTE — PROGRESS NOTES
[facility] Infect[facility] Disease [facility] Progress Note           Keely Yi DO    [phone] Office  [phone]  Fax    2022      Assessment & Plan:     GBS bacteremia due to right lower extremity cellulitis. Repeat blood cultures NGSF. TTE negative for vegetation. Continue cefazolin anticipate PICC line and 14 days of IV antibiotics at discharge. IV antibiotic orders are in the chart. Orders written for first dose of ceftriaxone today  Leukocytosis. Resolved. Bilateral lower extremity lymphedema. Discussed with wound care team.  Will refer to lymphedema clinic at discharge [facility] lymphedema clinic [phone]  Recurrent lower extremity cellulitis. Hemoglobin A1c 5.5           Subjective:     No complaints. Leg pain is improving    Objective:     Vitals: Visit Vitals  BP (!) 150/88 (BP 1 Location: Left upper arm, BP Patient Position: Sitting)   Pulse 73   Temp 98.3 °F (36.8 °C)   Resp 16   Ht 5' 9\" (1.753 m)   Wt 196 lb 3.4 oz (89 kg)   SpO2 96%   BMI 28.98 kg/m²          Tmax:  Temp (24hrs), Av.2 °F (36.8 °C), Min:98 °F (36.7 °C), Max:98.7 °F (37.1 °C)      Exam:   Patient is intubated:  no    Physical Examination:   General:  Alert, cooperative, no distress   Head:  Normocephalic, atraumatic. Eyes:  Conjunctivae clear   Neck: Supple       Lungs:   No distress. Chest wall:     Heart:     Abdomen:   non-distended   Extremities: Moves all. RLE erythema improving. Bilateral lower extremity edema   Skin: No acute rash on exposed skin   Neurologic: CNII-XII intact. Normal strength     Labs:        No lab exists for component: ITNL   No results for input(s): CPK, CKMB, TROIQ in the last 72 hours.     Recent Labs     22  0046 22  0237 22  1225    142  --    K 3.5 3.7  --     109*  --    CO2   --    BUN 11 9  --    CREA 0.95 0.91  0.90  --    * 90  --    PHOS 3.0  --   --    MG 1.9  --   --    WBC 6.6 7.4 10.4   HGB 12.5 12.7 13.5   HCT 35.9* 37.1 39.5    271 285       No results for input(s): INR, PTP, APTT, INREXT, INREXT in the last 72 hours. Needs: urine analysis, urine sodium, protein and creatinine  No results found for: MAYANK, CREAU      Cultures:     No results found for: BELLA  Lab Results   Component Value Date/Time    Culture result: NO GROWTH 2 DAYS 08/24/2022 12:25 PM    Culture result: MRSA NOT PRESENT 08/23/2022 10:10 AM    Culture result:  08/23/2022 10:10 AM     Screening of patient nares for MRSA is for surveillance purposes and, if positive, to facilitate isolation considerations in high risk settings. It is not intended for automatic decolonization interventions per se as regimens are not sufficiently effective to warrant routine use.          Radiology:     Medications       Current Facility-Administered Medications   Medication Dose Route Frequency Last Admin    cefTRIAXone (ROCEPHIN) 2 g in 0.9% sodium chloride 20 mL IV syringe  2 g IntraVENous Q24H      oxyCODONE IR (ROXICODONE) tablet 5 mg  5 mg Oral Q4H PRN      HYDROmorphone (DILAUDID) syringe 0.5 mg  0.5 mg IntraVENous Q4H PRN      hydrALAZINE (APRESOLINE) 20 mg/mL injection 20 mg  20 mg IntraVENous Q6H PRN      alteplase (CATHFLO) 1 mg in sterile water (preservative free) 1 mL injection  1 mg InterCATHeter PRN      sodium chloride (NS) flush 5-40 mL  5-40 mL IntraVENous Q8H 10 mL at 08/26/22 0622    sodium chloride (NS) flush 5-40 mL  5-40 mL IntraVENous PRN      acetaminophen (TYLENOL) tablet 650 mg  650 mg Oral Q6H  mg at 08/22/22 1534    Or    acetaminophen (TYLENOL) suppository 650 mg  650 mg Rectal Q6H PRN      polyethylene glycol (MIRALAX) packet 17 g  17 g Oral DAILY PRN      ondansetron (ZOFRAN ODT) tablet 4 mg  4 mg Oral Q8H PRN      Or    ondansetron (ZOFRAN) injection 4 mg  4 mg IntraVENous Q6H PRN      enoxaparin (LOVENOX) injection 40 mg  40 mg SubCUTAneous DAILY 40 mg at 08/26/22 2129           Case discussed with: Case management      Chidi Piña, DO

## 2022-08-26 NOTE — DISCHARGE INSTRUCTIONS
HOSPITALIST DISCHARGE INSTRUCTIONS  NAME: Michelle Richards   :  1965   MRN:  [id number]     Date/Time:  2022 7:42 AM    ADMIT DATE: 2022     DISCHARGE DATE: 2022     ADMITTING DIAGNOSIS:  Cellulitis   Lymphedema   Bacteremia (blood stream infection)     DISCHARGE DIAGNOSIS:  As above    MEDICATIONS:  Ceftriaxone 2 g IV daily through 2022     It is important that you take the medication exactly as they are prescribed. Keep your medication in the bottles provided by the pharmacist and keep a list of the medication names, dosages, and times to be taken in your wallet. Do not take other medications without consulting your doctor. Pain Management: per above medications    What to do at Home    Recommended diet:  Resume previous diet    Recommended activity: Activity as tolerated    If you experience any of the following symptoms then please call your primary care physician or return to the emergency room if you cannot get hold of your doctor:  Fever, chills, nausea, vomiting, diarrhea, change in mentation, falling, bleeding, shortness of breath, chest pain     Follow Up:  Please follow-up with lymphedema clinic as advised   Please follow-up with Dr. Mariano Kanner as advised     Austen Edmondson  Physician  [address], Jasmyne Hernandez Sherril Hood A Infectious Disease Physician     Austen Edmondson  Physician  Provider Summary    Title Provider type   DO Physician     Primary Contact Information    Phone Fax E-mail Address   [phone] Not available [facility]    [address] [place] [address] [address]       Information obtained by :  I understand that if any problems occur once I am at home I am to contact my physician. I understand and acknowledge receipt of the instructions indicated above.                                                                                                                                            Physician's or R.N.'s Signature Date/Time                                                                                                                                              Patient or Representative Signature                                                          Date/Time

## 2022-08-26 NOTE — PROGRESS NOTES
Bedside shift change report given to [address] [place] [address] (oncoming nurse) by Carleen Bajwa (offgoing nurse). Report included the following information SBAR, Kardex, Intake/Output, MAR, and Recent Results.

## 2022-08-26 NOTE — PROGRESS NOTES
Discharge instructions/AVS discussed in detail with pt. Pt verbalizes understanding of all instructions, including where to get new medications. Pt denies any questions about instructions and has signed paper copy AVS. Pt discharged per MD order, being driven home by wife. Pt in no apparent distress at time of discharge with no complaints. IV removed. PICC in placed for long-term abx. Clean,dry,intact.

## 2022-08-26 NOTE — PROGRESS NOTES
Problem: Falls - Risk of  Goal: *Absence of Falls  Description: Document Chencho Bocanegra Fall Risk and appropriate interventions in the flowsheet.   Outcome: Progressing Towards Goal  Note: Fall Risk Interventions:            Medication Interventions: Teach patient to arise slowly

## 2022-08-26 NOTE — DISCHARGE SUMMARY
Physician Discharge Summary     Patient ID:  Edelmira Winchester  [id number]  62 y.o.  1965    Admit date: 8/21/2022    Discharge date and time: 8/26/2022    Admission Diagnoses: Sepsis [facility]) [A41.9]    Discharge Diagnoses/Hospital Course   Mr. Dinorah Reese is a 63 yo male with bilateral LE lymphedema admitted for GBS bacteremia 2/2 RLE cellulitis. Repeat blood cultures negative. Started on cefazolin with improvement. PICC line placed and pt to be discharged on Ceftriaxone x 14 days. He will need close follow-up with lymphedema clinic. His BP in house was label. Suspect component of white coat HTN. If persistently elevated can consider starting an anti-hypertensive agent. Of note, A1c NOT c/w DM   PCP: None     Consults: ID    Discharge Exam:  Visit Vitals  /88   Pulse 73   Temp 98 °F (36.7 °C)   Resp 16   Ht 5' 9\" (1.753 m)   Wt 89 kg (196 lb 3.4 oz)   SpO2 98%   BMI 28.98 kg/m²      Gen:  Well-developed, well-nourished, in no acute distress  HEENT:  Pink conjunctivae, PERRL, hearing intact to voice, moist mucous membranes  Neck:  Supple, without masses, thyroid non-tender  Resp:  No accessory muscle use, clear breath sounds without wheezes rales or rhonchi  Card:  No murmurs, normal S1, S2 without thrills, +lymphedema  Abd:  Soft, non-tender, non-distended, normoactive bowel sounds are present  Musc:  No cyanosis or clubbing  Skin: improved erythema   Neuro:  Cranial nerves 3-12 are grossly intact, follows commands appropriately  Psych:  Good insight, oriented to person, place and time, alert     Disposition: home    Patient Instructions:   Discharge Medication List as of 8/26/2022 12:03 PM        START taking these medications    Details   OTHER,NON-FORMULARY, Ceftriaxone 2 g IV daily through 9/6/2022, Print, Disp-1 Each, R-0      cephALEXin (Keflex) 500 mg capsule Take 1 Capsule by mouth four (4) times daily for 10 days. , Normal, Disp-40 Capsule, R-0AS NEEDED FOR DEVELOPMENT OF CELLULITIS              Activity: Activity as tolerated  Diet: Resume previous diet  Wound Care: As directed    Follow-up with ID and lymphedema clinic as advised     Approximate time spent in patient care on day of discharge: 35 minutes     Signed:  Guera Hansen MD  8/26/2022  3:55 PM

## 2022-08-29 ENCOUNTER — TELEPHONE (OUTPATIENT)
Dept: FAMILY MEDICINE CLINIC | Age: 57
End: 2022-08-29

## 2022-08-29 LAB
BACTERIA SPEC CULT: NORMAL
SERVICE CMNT-IMP: NORMAL

## 2022-09-01 ENCOUNTER — HOSPITAL ENCOUNTER (OUTPATIENT)
Dept: PHYSICAL THERAPY | Age: 57
Discharge: HOME | End: 2022-09-01
Payer: COMMERCIAL

## 2022-09-01 VITALS
WEIGHT: 198 LBS | SYSTOLIC BLOOD PRESSURE: 156 MMHG | HEIGHT: 69 IN | OXYGEN SATURATION: 100 % | BODY MASS INDEX: 29.33 KG/M2 | DIASTOLIC BLOOD PRESSURE: 106 MMHG | HEART RATE: 68 BPM

## 2022-09-01 PROCEDURE — 97161 PT EVAL LOW COMPLEX 20 MIN: CPT

## 2022-09-01 PROCEDURE — 97530 THERAPEUTIC ACTIVITIES: CPT

## 2022-09-01 PROCEDURE — 97535 SELF CARE MNGMENT TRAINING: CPT

## 2022-09-01 PROCEDURE — 97162 PT EVAL MOD COMPLEX 30 MIN: CPT

## 2022-09-01 NOTE — PROGRESS NOTES
Statement of Medical Necessity  Page 1 of 2      Eddie Real 1965 Today's Date: 9/1/2022 EUGENIO: [employer]   Payor: [employer] / Plan: Eliot Mendoza PPO / Product Type: PPO /  ME: TBD  Refills: 2               Diagnosis  []   I97.2 Post-Mastectomy Lymphedema []   I87.2 Venous Insufficiency   []   I89.0 Lymphedema, other secondary  []   I83.019 Venous Stasis Ulcer LE, Right   []   I89.9 Unspecified Lymphatic Disorder []   I83.029 Venous Stasis Ulcer LE, Left   [x]   R60.9 Swelling not relieved by elevation [x]   Q82.0 Primary Lymphedema   []   C50.211 Malignant neoplasm of breast, Right []   G89.3  Cancer associated pain   []   C50.212 Malignant neoplasm of breast, Left []   L03.115 LE Cellulitis, Right   []    []   L03.116 LE Cellulitis, Left                                                           Eddie Real    1965  Page 2 of 2    Physician Order for DME for Diagnosis of Primary Lymphedema Q82.0, Swelling not relieved by elevation R60.9  as Listed on Statement of Medical Necessity, Page 1        Recommended Product:  Units Upper Extremity Rt Lt Units Lower Extremity Rt Lt    Circ-Aid, Ready Wrap, Sigvaris Arm   2 Inelastic binders (Selam Dikes)  [x]Foot   [x]Below Knee   []Knee   []Thigh x x    Circ-Aid Ready Wrap, Sigvaris hand   2 Elan Pradeep, night use []Full Leg  [x]Lower Leg x x    Tribute Arm, night use   2 Tribute, night use  []Full Leg  [x]Lower Leg x x    Elan Pradeep Arm, night use    Eric Sleeve Leg/ Foot, night use      Gradiant Compression Sleeves & Gloves  []Custom [] RM Arm:  []CCL1 []CCL2 []CCL3  []Custom [] RM Glove: []CCL1 []CCL2 []CCL3     4 Gradient Compression Stockings   []Custom  []RM Lower Extremity:   []CCL1       []CCL2         [x]CCL3   [x]Knee       []Thigh        []Waist Length x x    Eric sleeve arm w/ hand, night use   2 Tribute Wrap, night use x x    Compression Bra          Compression Vest         The above patient was referred for treatment of Lymphedema due to the diagnosis indicated above. Lymphedema is a progressive and chronic condition. It may cause acute infections, muscle atrophy, discomfort, and connective tissue fibrosis with irreversible tissue damage, decreased ROM in the affected limb and diminished functional mobility possibly interfering with independence and ability to work. Recurrent infections and wound complications that commonly occur with Lymphedema often require hospitalization and extensive wound care, thus increasing medical costs. The patient has received complete decongestive therapy which includes manual lymphatic drainage, lymphedema specific exercises, compression bandaging, and hygiene/skin care. Goals of therapy are to reduce the edema and prevent re-accumulation of fluid with its complications. It is medically necessary for this patient to have daytime garments to control this condition. They will need 2 sets (one set to wear and one set to wash for adequate skin care and wearing time). Garments must be replaced every 4-6 months for effectiveness. There are no substitutes available that offer acceptable compression treatment for this Lymphedema patient. If further information is requested, please contact our certified lymphedema therapists at [phone].     [] Chandan Arellano, PT, DPT, CLT-EVANGELINA  [] Eric Sanz PT, CLT-EVANGELINA  [] Quirino PT, DPT, CLT-EVANGELINA  [x] Thi Pemberton, PT, DPT, [address]    Printed  Provider Name Waqas Pearl DO Provider Signature  Date    Provider NPI [id number]

## 2022-09-01 NOTE — PROGRESS NOTES
[address] [facility]  [address]  [place] [address]      INITIAL EVALUATION    NAME: Allegra Vance AGE: 62 y.o. GENDER: male  DATE: 9/1/2022  REFERRING PHYSICIAN: Sena Evans DO  HISTORY AND BACKGROUND: Pt is a 61 y/o male recently hospitalized for BLE cellulitis with sepsis infection 8/21-8/26. Pt required inpatient hospitalization due to severity of BLE infection. Pt reports he has had swelling for as long as he can remember that fluctuates in severity of the BLEs, and recurrent cellulitis infections. Approximately 3 recurrences over the past few years. He notes his legs have now become the biggest they have ever been since the most recent cellulitis infection. Pt reports multiple negative doppler studies for DVT during his hospitalization, as well as in the past. Pt was given antibiotic therapy while in the hospital, and continues antibiotic therapy x14 days via PICC placement since hospitalization. He notes therapy will be complete on 9/5 or 9/6 next week. He notes he is independent at baseline, and works full time as a . He reports LE swelling is usually better in the morning, and worst at night after he has been on his feet all day. Pt reports he must wear closed toe shoes, and protective gear while working as a safety precaution. Pt notes he purchased a compression garment for his legs after the recent cellulitis infection which he has worn with variable success. He arrives to today's appointment without garments. Primary Diagnosis:  Primary lymphedema (Q82.0)  Other Treatment Diagnoses:  Swelling not relieved by elevation (R60.9)    Date of Onset: Unknown exact, however patient reports long standing history of progressively worsening swelling.      Present Symptoms and Functional Limitations: Heaviness of BLEs creating in creased discomfort and difficulty performing standing related activities, work related activities, and recurrent cellulitis infections. Lymphedema Life Impact Scale (LLIS): 21/68 or 31% impairment. Past Medical History:   Past Medical History:   Diagnosis Date    History of vascular access device 08/25/2022    [facility] VAT FOR LT ABX: R BASILIC SINGLE LUMEN PICC 4 FR LENGTH 45 CM ARM CIRC 35 MAX P 0 (CM)  OUT     Past Surgical History:   Procedure Laterality Date    HX ORTHOPAEDIC       Current Medications:    Current Outpatient Medications   Medication Sig    OTHER,NON-FORMULARY, Ceftriaxone 2 g IV daily through 9/6/2022    cephALEXin (Keflex) 500 mg capsule Take 1 Capsule by mouth four (4) times daily for 10 days. No current facility-administered medications for this encounter. Allergies: No Known Allergies     Prior Level of Function/Social/Work History/Personal factors and/or comorbidities impacting plan of care: Patient works full time as . Living Situation: Lives with family   Trainable Caregiver?:  Lives with wife, unknown if she is able to assist with management of symptoms and condition  Mobility: Independent gait without any assistive device for community distances  Sleeping Arrangement:  in bed  Adaptive Equipment Owned: none  Other: Patient is able to don compression stockings and unknown if he has assistance available at home. Community Resources Addressed (if applicable): Yes    Previous Therapy:  None. Compression/Lymphedema Equipment: Has an over the counter 10-20 mmHg below knee compression garment. SUBJECTIVE:   Patients goals for therapy: decrease swelling.    OBJECTIVE DATA SUMMARY:   EXAMINATION/PRESENTATION/DECISION MAKING:   Pain:  0/10 reported BLEs     Self-Care and ADLs: Independent with all self care and ADLs per patient report    Skin and Tissue Assessment:  Dermal Status: Intact    Texture/Consistency: Boggy  and Pitting Edema    Pigmentation/Color Change: Normal    Anomalies: N/A    Circulatory: Vascular studies ruled out DVT in past    Nails: Normal    Stemmers Sign: Positive    Height:     Weight:      BMI:     (36 or greater: adversely affecting lymphedema)  Wound/Ulcer:  none noted        Volumetric Measurements:   Right:  8369.89 mL Left:  8235. 32 mL   % Difference: 1.63 Dominance: R   (See scanned graph)  Range of Motion: [facility] for bilateral lower extremities and WFL for bilateral upper extremities  Strength: WFL  Sensation:  Intact     Mobility:    Bed/Chair Mobility: Independent  Transfers: Independent  Gait: Independent  Endurance: Demonstrates adequate endurance for all functional activity completion, and ambulation to/from clinic. Other:     Safety:  Patient is alert and oriented: A&O x4  Safety awareness: intact  Fall risk?: No  Patient given written fall prevention handout: Yes       Physical Therapy Evaluation Charge Determination   History Examination Presentation Decision-Making   LOW Complexity : Zero comorbidities / personal factors that will impact the outcome / POC LOW Complexity : 1-2 Standardized tests and measures addressing body structure, function, activity limitation and / or participation in recreation  MEDIUM Complexity : Evolving with changing characteristics  LOW Complexity : FOTO score of   LLIS 31% impairment,  Volumetric measurements, circumferential measurements      Based on the above components, the patient evaluation is determined to be of the following complexity level: LOW     Evaluation Time: 9663-0667 - 30 mins    TREATMENT PROVIDED:   1. Treatment description:  Therapeutic Exercise and Procedure: Patient instructed in activity restrictions and exercise guidelines. Patient measured for OTS compression garments for BLEs Zahra 3512 30-40 mmHg compression garments to promote improved BLE edema management, and improved tolerance for work related duties completion and standing tolerance as it relates to functional activity completion.  Patient educated regarding compression garment wear schedule and encouraged to begin wear upon receipt of garments donning during the morning and continued utilization throughout the day. Compression garment care reviewed with patient to promote optimal maintenance. Compression garment precautions reviewed with patient to ensure optimal safety with wear. Patient educated regarding pathophysiology of lymphedema to promote understanding and improved maintenance of condition with reduced risk for recurrent infection. Patient educated regarding benefits and drawbacks of various compression garments to allow for optimal decision making regarding garment selection for optimal utilization of garments, and swelling management. Patient does participate in either a walking or other exercise program almost daily. Treatment time:  1204-4506  Minutes: 40    2. Treatment description:  Self Care: Patient instructed in skin care principles and anatomy of the lymphatic system. Verbal instruction provided for optimal skin care including moisturize protocols. Reviewed signs and symptoms of infection, and DVT to promote optimal self management of condition and safety. Standard lymphedema precautions to include avoiding blood pressure readings, injections and IVs or other procedures/acts that could lead to broken skin on affected area, and avoiding excessive heat, resistive activity or altitude without compression garment. Treatment time:  7571-9822  Minutes: 25  Pain Level: 0/10 NPRS for BLEs  ASSESSMENT:   Rena Alexander is a 62 y.o. male who presents with stage II lymphedema of his LE: bilateral foot ankle calf. Patient is highly motivated to improve his condition and is seeking to expand his compression to include OTS Juzo 3512 30-40 mmHg below knee compression B. Patient's condition is complicated by working environment, reduced knowledge of management of edema, reduced knowledge of s/s of infection/DVT.  Patient will benefit from complete decongestive therapy (CDT) including: Manual lymphatic drainage (MLD) technique, Short-stretch textile bandages/compression system to decongest limb, and Kinesiotaping as appropriate. Patient's swelling is currently mild and @he@ would greatly benefit from a compression garment. Current barriers to care include patient work environment which does not currently allow for the recommended course of care to include multi-layer bandaging for BLE limb size reduction prior to custom garment fitting. Consultation with extensive education regarding recommendations and treatment options results in patient preference to begin treatment with utilization of OTS garments with increased compression rating for improved BLE containment, and return to re-assessment of limb volumes following adherence to compression garment wear schedule and home management x1-2 weeks with compression garments. Patient educated regarding risks of self management with OTS garments, optimal garment wear schedule, and skin care routine. Will follow up post receipt of garments and assess home management efficacy. This care is medically necessary due to the infection risk with lymphedema and to improve functional activities. CDT is necessary to resolve swelling to allow patient to return to wearing normal clothes/footwear and prevent worsening of symptoms, such as infections and/or hospitalizations. Patient will be independent with home program strategies to allow improved ADL ability and mobility and to allow patient to return to greatest functional independence. Rehabilitation potential is considered to be Good. Factors which may influence rehabilitation potential include current barriers to participate in multi layer bandaging for edema management and limb volume reduction prior to measure for custom garments. Patient will benefit from 12 physical therapy visits over 12 weeks to optimize improvement in these areas.     PLAN OF CARE:   Recommendations and Planned Interventions: Manual lymph drainage/decongestive therapy, Multi-layer compression bandaging (short-stretch), Compression garment fitting/provision, Lymphedema therapeutic exercise, Education in skin care and lymphedema precautions, Self-MLD education per home program, Self-bandaging education per home program, and Caregiver education as needed    GOALS  Long term goals  Time frame: 12 weeks  1. Patient will demonstrate knowledge of signs/symptoms of infections/cellulitis and be independent in skin care to prevent cellulitis. 2.  Patient will demonstrate independence in lymphedema home program of therapeutic exercises to improve circulation and decongest limb to improve ADLs. 3.  Patient will tolerate OTS below knee compression garments and show measureable decrease in limb volume to allow ordering of home compression system (daytime, nighttime garments and pump as needed). 4.  Pt will show improvement in the LLIS by decreasing the score to 10/68 and thus allow improvement in patient's quality of life. 5.  Patient will be independent with don/doff of compression system and use in order to prevent reaccumulation of fluid at discharge. 6.  Pt will be independent in self-MLD and show stable limb volumes showing decongestion and pt. ready for transition to independent restorative phase of lymphedema therapy. Patient has participated in goal setting and agrees to work toward plan of care. Patient was instructed to call if questions or concerns arise. Thank you for this referral.  Bella Arias, PT       TREATMENT PLAN EFFECTIVE DATES:   9/1/2022 TO 11/24/2022  I have read the above plan of care for Melba Gregorio. I certify the above prescribed services are required by this patient and are medically necessary.   The above plan of care has been developed in conjunction with the lymphedema/physical therapist.       Physician Signature: ____________________________________Date:______________

## 2022-09-02 ENCOUNTER — TELEPHONE (OUTPATIENT)
Dept: FAMILY MEDICINE CLINIC | Age: 57
End: 2022-09-02

## 2022-09-07 LAB — CREATININE, EXTERNAL: 0.95

## 2022-09-09 ENCOUNTER — OFFICE VISIT (OUTPATIENT)
Dept: INFECTIOUS DISEASES | Age: 57
End: 2022-09-09
Payer: COMMERCIAL

## 2022-09-09 VITALS
HEIGHT: 69 IN | HEART RATE: 70 BPM | WEIGHT: 194 LBS | DIASTOLIC BLOOD PRESSURE: 104 MMHG | TEMPERATURE: 98.4 F | SYSTOLIC BLOOD PRESSURE: 175 MMHG | RESPIRATION RATE: 20 BRPM | BODY MASS INDEX: 28.73 KG/M2

## 2022-09-09 DIAGNOSIS — R78.81 BACTEREMIA: Primary | ICD-10-CM

## 2022-09-09 PROCEDURE — 99214 OFFICE O/P EST MOD 30 MIN: CPT | Performed by: INTERNAL MEDICINE

## 2022-09-09 NOTE — PROGRESS NOTES
[facility] Progress Note           Mackenzie Brown DO    [phone] Office  [phone]  Fax    9/9/2022      Assessment & Plan:     GBS bacteremia due to right lower extremity cellulitis. Completed IV antibiotics. PICC line pulled. Filled out [facility] paperwork with patient today. RTO as needed    Bilateral lower extremity lymphedema. Patient seen at lymphedema clinic and has been prescribed compression stockings  Recurrent lower extremity cellulitis. Patient given prescription for cephalexin to keep at home to take at first sign of returning infection. He will follow-up with me as needed   Hypertension. Follow-up with PCP          Subjective:     No complaints. Objective:     Vitals: Visit Vitals  BP (!) 175/104   Pulse 70   Temp 98.4 °F (36.9 °C) (Oral)   Resp 20   Ht 5' 9\" (1.753 m)   Wt 194 lb (88 kg)   BMI 28.65 kg/m²        Exam:       Physical Examination:   General:  Alert, cooperative, no distress   Head:  Normocephalic, atraumatic. Eyes:  Conjunctivae clear   Neck: Supple       Lungs:   No distress. Chest wall:     Heart:     Abdomen:    non-distended   Extremities: Moves all. Lower extremity compression stockings   Skin: No acute rash on exposed skin   Neurologic: CNII-XII intact. Normal strength     Labs:        No lab exists for component: ITNL   No results for input(s): CPK, CKMB, TROIQ in the last 72 hours. No results for input(s): NA, K, CL, CO2, BUN, CREA, GLU, PHOS, MG, ALB, WBC, HGB, HCT, PLT, HGBEXT, HCTEXT, PLTEXT in the last 72 hours. No lab exists for component:  CA  No results for input(s): INR, PTP, APTT, INREXT in the last 72 hours.   Needs: urine analysis, urine sodium, protein and creatinine  No results found for: MAYANK, CREAU      Cultures:     No results found for: SDES  Lab Results   Component Value Date/Time    Culture result: NO GROWTH 5 DAYS 08/24/2022 12:25 PM    Culture result: MRSA NOT PRESENT 08/23/2022 10:10 AM    Culture result: 08/23/2022 10:10 AM     Screening of patient nares for MRSA is for surveillance purposes and, if positive, to facilitate isolation considerations in high risk settings. It is not intended for automatic decolonization interventions per se as regimens are not sufficiently effective to warrant routine use. Radiology:     Medications       Current Outpatient Medications   Medication Sig Dispense    OTHER,NON-FORMULARY, Ceftriaxone 2 g IV daily through 9/6/2022 (Patient not taking: Reported on 9/9/2022) 1 Each     No current facility-administered medications for this visit.            Case discussed with:      Jose Alfredo Kaufman DO

## 2022-09-09 NOTE — PROGRESS NOTES
Chief Complaint   Patient presents with    Hospital Follow Up      Diagnosis: GBS bacteremia due to lower extremity cellulitis    Medication Evaluation     Antibiotic: Ceftriaxone 2 g IV daily through 9/6/2022

## 2022-10-20 ENCOUNTER — TELEPHONE (OUTPATIENT)
Dept: FAMILY MEDICINE CLINIC | Age: 57
End: 2022-10-20

## 2022-10-20 NOTE — TELEPHONE ENCOUNTER
Silvino James called from [address] to confirm Dr. Abilio Schulz received urgent request for orders to be completed and signed from the Lymphedema Clinic and faxed to [phone]. Please call [phone]. Dr. Abilio Schulz completed forms and these were faxed 10/20/22.  Jayna